# Patient Record
Sex: FEMALE | Race: WHITE | NOT HISPANIC OR LATINO | URBAN - METROPOLITAN AREA
[De-identification: names, ages, dates, MRNs, and addresses within clinical notes are randomized per-mention and may not be internally consistent; named-entity substitution may affect disease eponyms.]

---

## 2023-05-02 ENCOUNTER — TELEPHONE (OUTPATIENT)
Dept: PRIMARY CARE | Facility: CLINIC | Age: 68
End: 2023-05-02
Payer: MEDICARE

## 2023-05-05 LAB
ALANINE AMINOTRANSFERASE (SGPT) (U/L) IN SER/PLAS: 10 U/L (ref 7–45)
ALBUMIN (G/DL) IN SER/PLAS: 3.9 G/DL (ref 3.4–5)
ALKALINE PHOSPHATASE (U/L) IN SER/PLAS: 76 U/L (ref 33–136)
ANION GAP IN SER/PLAS: 12 MMOL/L (ref 10–20)
ASPARTATE AMINOTRANSFERASE (SGOT) (U/L) IN SER/PLAS: 14 U/L (ref 9–39)
BASOPHILS (10*3/UL) IN BLOOD BY AUTOMATED COUNT: 0.05 X10E9/L (ref 0–0.1)
BASOPHILS/100 LEUKOCYTES IN BLOOD BY AUTOMATED COUNT: 0.7 % (ref 0–2)
BILIRUBIN TOTAL (MG/DL) IN SER/PLAS: 0.6 MG/DL (ref 0–1.2)
CALCIUM (MG/DL) IN SER/PLAS: 9.6 MG/DL (ref 8.6–10.3)
CARBON DIOXIDE, TOTAL (MMOL/L) IN SER/PLAS: 28 MMOL/L (ref 21–32)
CHLORIDE (MMOL/L) IN SER/PLAS: 103 MMOL/L (ref 98–107)
CREATININE (MG/DL) IN SER/PLAS: 0.8 MG/DL (ref 0.5–1.05)
EOSINOPHILS (10*3/UL) IN BLOOD BY AUTOMATED COUNT: 0.16 X10E9/L (ref 0–0.7)
EOSINOPHILS/100 LEUKOCYTES IN BLOOD BY AUTOMATED COUNT: 2.2 % (ref 0–6)
ERYTHROCYTE DISTRIBUTION WIDTH (RATIO) BY AUTOMATED COUNT: 14.6 % (ref 11.5–14.5)
ERYTHROCYTE MEAN CORPUSCULAR HEMOGLOBIN CONCENTRATION (G/DL) BY AUTOMATED: 32 G/DL (ref 32–36)
ERYTHROCYTE MEAN CORPUSCULAR VOLUME (FL) BY AUTOMATED COUNT: 95 FL (ref 80–100)
ERYTHROCYTES (10*6/UL) IN BLOOD BY AUTOMATED COUNT: 4.49 X10E12/L (ref 4–5.2)
GFR FEMALE: 80 ML/MIN/1.73M2
GLUCOSE (MG/DL) IN SER/PLAS: 90 MG/DL (ref 74–99)
HEMATOCRIT (%) IN BLOOD BY AUTOMATED COUNT: 42.8 % (ref 36–46)
HEMOGLOBIN (G/DL) IN BLOOD: 13.7 G/DL (ref 12–16)
IMMATURE GRANULOCYTES/100 LEUKOCYTES IN BLOOD BY AUTOMATED COUNT: 0.4 % (ref 0–0.9)
LEUKOCYTES (10*3/UL) IN BLOOD BY AUTOMATED COUNT: 7.3 X10E9/L (ref 4.4–11.3)
LYMPHOCYTES (10*3/UL) IN BLOOD BY AUTOMATED COUNT: 2.17 X10E9/L (ref 1.2–4.8)
LYMPHOCYTES/100 LEUKOCYTES IN BLOOD BY AUTOMATED COUNT: 29.7 % (ref 13–44)
MONOCYTES (10*3/UL) IN BLOOD BY AUTOMATED COUNT: 0.7 X10E9/L (ref 0.1–1)
MONOCYTES/100 LEUKOCYTES IN BLOOD BY AUTOMATED COUNT: 9.6 % (ref 2–10)
NEUTROPHILS (10*3/UL) IN BLOOD BY AUTOMATED COUNT: 4.2 X10E9/L (ref 1.2–7.7)
NEUTROPHILS/100 LEUKOCYTES IN BLOOD BY AUTOMATED COUNT: 57.4 % (ref 40–80)
PLATELETS (10*3/UL) IN BLOOD AUTOMATED COUNT: 307 X10E9/L (ref 150–450)
POTASSIUM (MMOL/L) IN SER/PLAS: 4.3 MMOL/L (ref 3.5–5.3)
PROTEIN TOTAL: 6.4 G/DL (ref 6.4–8.2)
SODIUM (MMOL/L) IN SER/PLAS: 139 MMOL/L (ref 136–145)
THYROTROPIN (MIU/L) IN SER/PLAS BY DETECTION LIMIT <= 0.05 MIU/L: 1.43 MIU/L (ref 0.44–3.98)
UREA NITROGEN (MG/DL) IN SER/PLAS: 16 MG/DL (ref 6–23)

## 2023-05-09 ENCOUNTER — OFFICE VISIT (OUTPATIENT)
Dept: PRIMARY CARE | Facility: CLINIC | Age: 68
End: 2023-05-09
Payer: MEDICARE

## 2023-05-09 VITALS
SYSTOLIC BLOOD PRESSURE: 120 MMHG | DIASTOLIC BLOOD PRESSURE: 78 MMHG | TEMPERATURE: 97.9 F | WEIGHT: 175 LBS | BODY MASS INDEX: 28.12 KG/M2 | HEIGHT: 66 IN

## 2023-05-09 DIAGNOSIS — R05.9 COUGH, UNSPECIFIED TYPE: ICD-10-CM

## 2023-05-09 DIAGNOSIS — E78.2 MIXED HYPERLIPIDEMIA: ICD-10-CM

## 2023-05-09 DIAGNOSIS — I10 PRIMARY HYPERTENSION: ICD-10-CM

## 2023-05-09 DIAGNOSIS — E03.9 HYPOTHYROIDISM, UNSPECIFIED TYPE: Primary | ICD-10-CM

## 2023-05-09 DIAGNOSIS — J98.01 BRONCHOSPASM: ICD-10-CM

## 2023-05-09 DIAGNOSIS — Z12.31 ENCOUNTER FOR SCREENING MAMMOGRAM FOR MALIGNANT NEOPLASM OF BREAST: ICD-10-CM

## 2023-05-09 DIAGNOSIS — R73.9 ELEVATED BLOOD SUGAR: ICD-10-CM

## 2023-05-09 PROBLEM — M54.16 LUMBAR RADICULOPATHY: Status: ACTIVE | Noted: 2023-05-09

## 2023-05-09 PROBLEM — E55.9 VITAMIN D DEFICIENCY: Status: ACTIVE | Noted: 2023-05-09

## 2023-05-09 PROBLEM — I67.83 PRES (POSTERIOR REVERSIBLE ENCEPHALOPATHY SYNDROME): Status: ACTIVE | Noted: 2023-05-09

## 2023-05-09 PROBLEM — K64.8 PROLAPSED INTERNAL HEMORRHOIDS: Status: ACTIVE | Noted: 2023-05-09

## 2023-05-09 PROBLEM — F41.8 ANXIETY ASSOCIATED WITH DEPRESSION: Status: ACTIVE | Noted: 2023-05-09

## 2023-05-09 PROBLEM — G47.00 INSOMNIA: Status: ACTIVE | Noted: 2023-05-09

## 2023-05-09 PROBLEM — K64.9 HEMORRHOID: Status: ACTIVE | Noted: 2023-05-09

## 2023-05-09 PROBLEM — Z98.890 H/O MITRAL VALVE REPAIR: Status: ACTIVE | Noted: 2023-05-09

## 2023-05-09 PROBLEM — H35.363 PERIPHERAL DRUSEN OF BOTH EYES: Status: ACTIVE | Noted: 2023-05-09

## 2023-05-09 PROBLEM — H52.13 BILATERAL MYOPIA: Status: ACTIVE | Noted: 2023-05-09

## 2023-05-09 PROBLEM — R01.1 CARDIAC MURMUR, UNSPECIFIED: Status: ACTIVE | Noted: 2023-05-09

## 2023-05-09 PROBLEM — R26.81 UNSTEADY GAIT: Status: ACTIVE | Noted: 2023-05-09

## 2023-05-09 PROBLEM — M54.9 BACK PAIN: Status: ACTIVE | Noted: 2023-05-09

## 2023-05-09 PROBLEM — R10.2 PELVIC PRESSURE IN FEMALE: Status: ACTIVE | Noted: 2023-05-09

## 2023-05-09 PROBLEM — I34.0 MITRAL REGURGITATION: Status: ACTIVE | Noted: 2023-05-09

## 2023-05-09 PROBLEM — H25.13 NUCLEAR SCLEROSIS OF BOTH EYES: Status: ACTIVE | Noted: 2023-05-09

## 2023-05-09 PROBLEM — E04.1 THYROID NODULE: Status: ACTIVE | Noted: 2023-05-09

## 2023-05-09 PROBLEM — K59.00 CONSTIPATION: Status: ACTIVE | Noted: 2023-05-09

## 2023-05-09 PROBLEM — H52.4 BILATERAL PRESBYOPIA: Status: ACTIVE | Noted: 2023-05-09

## 2023-05-09 PROBLEM — R79.89 ABNORMAL THYROID BLOOD TEST: Status: ACTIVE | Noted: 2023-05-09

## 2023-05-09 PROCEDURE — 1159F MED LIST DOCD IN RCRD: CPT | Performed by: NURSE PRACTITIONER

## 2023-05-09 PROCEDURE — 3078F DIAST BP <80 MM HG: CPT | Performed by: NURSE PRACTITIONER

## 2023-05-09 PROCEDURE — 1036F TOBACCO NON-USER: CPT | Performed by: NURSE PRACTITIONER

## 2023-05-09 PROCEDURE — 99214 OFFICE O/P EST MOD 30 MIN: CPT | Performed by: NURSE PRACTITIONER

## 2023-05-09 PROCEDURE — 3074F SYST BP LT 130 MM HG: CPT | Performed by: NURSE PRACTITIONER

## 2023-05-09 PROCEDURE — 1160F RVW MEDS BY RX/DR IN RCRD: CPT | Performed by: NURSE PRACTITIONER

## 2023-05-09 RX ORDER — BENZONATATE 200 MG/1
200 CAPSULE ORAL 3 TIMES DAILY PRN
Qty: 42 CAPSULE | Refills: 0 | Status: SHIPPED | OUTPATIENT
Start: 2023-05-09 | End: 2023-06-08

## 2023-05-09 RX ORDER — ACETAMINOPHEN 500 MG
50 TABLET ORAL DAILY
COMMUNITY
Start: 2014-03-11 | End: 2023-11-21 | Stop reason: WASHOUT

## 2023-05-09 RX ORDER — AMLODIPINE BESYLATE 10 MG/1
10 TABLET ORAL DAILY
COMMUNITY
End: 2023-05-09 | Stop reason: SDUPTHER

## 2023-05-09 RX ORDER — ALBUTEROL SULFATE 90 UG/1
AEROSOL, METERED RESPIRATORY (INHALATION)
COMMUNITY
Start: 2018-04-13 | End: 2023-11-21 | Stop reason: WASHOUT

## 2023-05-09 RX ORDER — NEBIVOLOL 10 MG/1
10 TABLET ORAL DAILY
COMMUNITY
End: 2023-05-09 | Stop reason: SDUPTHER

## 2023-05-09 RX ORDER — MAGNESIUM 200 MG
1 TABLET ORAL DAILY
COMMUNITY
Start: 2022-07-07

## 2023-05-09 RX ORDER — LOSARTAN POTASSIUM 100 MG/1
100 TABLET ORAL DAILY
COMMUNITY
End: 2023-05-09 | Stop reason: SDUPTHER

## 2023-05-09 RX ORDER — ASPIRIN 81 MG/1
1 TABLET ORAL DAILY
COMMUNITY
Start: 2021-10-22

## 2023-05-09 RX ORDER — LOSARTAN POTASSIUM 100 MG/1
100 TABLET ORAL DAILY
Qty: 90 TABLET | Refills: 3 | Status: SHIPPED | OUTPATIENT
Start: 2023-05-09 | End: 2024-06-03 | Stop reason: SDUPTHER

## 2023-05-09 RX ORDER — LEVOTHYROXINE SODIUM 100 UG/1
100 TABLET ORAL
COMMUNITY
End: 2023-05-09 | Stop reason: SDUPTHER

## 2023-05-09 RX ORDER — CETIRIZINE HYDROCHLORIDE 10 MG/1
1 TABLET ORAL DAILY PRN
COMMUNITY
Start: 2014-03-11 | End: 2023-11-21 | Stop reason: WASHOUT

## 2023-05-09 RX ORDER — LEVOTHYROXINE SODIUM 100 UG/1
100 TABLET ORAL
Qty: 90 TABLET | Refills: 3 | Status: SHIPPED | OUTPATIENT
Start: 2023-05-09 | End: 2023-11-21 | Stop reason: WASHOUT

## 2023-05-09 RX ORDER — NEBIVOLOL 10 MG/1
10 TABLET ORAL DAILY
Qty: 90 TABLET | Refills: 3 | Status: SHIPPED | OUTPATIENT
Start: 2023-05-09 | End: 2024-06-03 | Stop reason: SDUPTHER

## 2023-05-09 RX ORDER — AMLODIPINE BESYLATE 10 MG/1
10 TABLET ORAL DAILY
Qty: 90 TABLET | Refills: 3 | Status: SHIPPED | OUTPATIENT
Start: 2023-05-09 | End: 2024-06-03

## 2023-05-09 ASSESSMENT — PATIENT HEALTH QUESTIONNAIRE - PHQ9
2. FEELING DOWN, DEPRESSED OR HOPELESS: NOT AT ALL
1. LITTLE INTEREST OR PLEASURE IN DOING THINGS: NOT AT ALL
SUM OF ALL RESPONSES TO PHQ9 QUESTIONS 1 AND 2: 0

## 2023-05-09 NOTE — PROGRESS NOTES
Subjective   Patient ID: Jeanna Haynes is a 67 y.o. female who presents for Follow-up (Review labs, med refill).       Recent Results (from the past 1008 hour(s))   TSH with reflex to Free T4 if abnormal    Collection Time: 05/05/23 10:09 AM   Result Value Ref Range    TSH 1.43 0.44 - 3.98 mIU/L   CBC and Auto Differential    Collection Time: 05/05/23 10:09 AM   Result Value Ref Range    WBC 7.3 4.4 - 11.3 x10E9/L    RBC 4.49 4.00 - 5.20 x10E12/L    Hemoglobin 13.7 12.0 - 16.0 g/dL    Hematocrit 42.8 36.0 - 46.0 %    MCV 95 80 - 100 fL    MCHC 32.0 32.0 - 36.0 g/dL    Platelets 307 150 - 450 x10E9/L    RDW 14.6 (H) 11.5 - 14.5 %    Neutrophils % 57.4 40.0 - 80.0 %    Immature Granulocytes %, Automated 0.4 0.0 - 0.9 %    Lymphocytes % 29.7 13.0 - 44.0 %    Monocytes % 9.6 2.0 - 10.0 %    Eosinophils % 2.2 0.0 - 6.0 %    Basophils % 0.7 0.0 - 2.0 %    Neutrophils Absolute 4.20 1.20 - 7.70 x10E9/L    Lymphocytes Absolute 2.17 1.20 - 4.80 x10E9/L    Monocytes Absolute 0.70 0.10 - 1.00 x10E9/L    Eosinophils Absolute 0.16 0.00 - 0.70 x10E9/L    Basophils Absolute 0.05 0.00 - 0.10 x10E9/L   Comprehensive Metabolic Panel    Collection Time: 05/05/23 10:09 AM   Result Value Ref Range    Glucose 90 74 - 99 mg/dL    Sodium 139 136 - 145 mmol/L    Potassium 4.3 3.5 - 5.3 mmol/L    Chloride 103 98 - 107 mmol/L    Bicarbonate 28 21 - 32 mmol/L    Anion Gap 12 10 - 20 mmol/L    Urea Nitrogen 16 6 - 23 mg/dL    Creatinine 0.80 0.50 - 1.05 mg/dL    GFR Female 80 >90 mL/min/1.73m2    Calcium 9.6 8.6 - 10.3 mg/dL    Albumin 3.9 3.4 - 5.0 g/dL    Alkaline Phosphatase 76 33 - 136 U/L    Total Protein 6.4 6.4 - 8.2 g/dL    AST 14 9 - 39 U/L    Total Bilirubin 0.6 0.0 - 1.2 mg/dL    ALT (SGPT) 10 7 - 45 U/L           HPI     Just had surgery on left foot.  Fused toe and bunion was shaved off.    Has been 3 months tomorrow.    PRES - symptoms have all resolved.  Did not get COVID   Trying to get more active again.    Not a great sleeper.   "Chronic    Review of Systems   Constitutional:  Positive for activity change.   Musculoskeletal:  Positive for gait problem.   Psychiatric/Behavioral:  Positive for sleep disturbance.          Objective   /78   Temp 36.6 °C (97.9 °F)   Ht 1.664 m (5' 5.5\")   Wt 79.4 kg (175 lb)   BMI 28.68 kg/m²     Physical Exam  Vitals and nursing note reviewed.   Constitutional:       Appearance: Normal appearance.   HENT:      Head: Normocephalic and atraumatic.      Right Ear: Tympanic membrane, ear canal and external ear normal.      Left Ear: Tympanic membrane, ear canal and external ear normal.      Nose: Nose normal.      Mouth/Throat:      Pharynx: Oropharynx is clear.   Neck:      Thyroid: No thyroid mass, thyromegaly or thyroid tenderness.   Cardiovascular:      Rate and Rhythm: Normal rate and regular rhythm.      Heart sounds: Normal heart sounds.   Pulmonary:      Effort: Pulmonary effort is normal.      Breath sounds: Normal breath sounds.   Abdominal:      General: Bowel sounds are normal.      Palpations: Abdomen is soft.   Genitourinary:     Comments: Defer  Musculoskeletal:      Cervical back: Normal range of motion and neck supple.      Comments: Mildly antalgic gait noted   Lymphadenopathy:      Cervical: No cervical adenopathy.   Skin:     General: Skin is warm.   Neurological:      Mental Status: She is alert and oriented to person, place, and time. Mental status is at baseline.   Psychiatric:         Mood and Affect: Mood normal.         Behavior: Behavior normal.         Thought Content: Thought content normal.         Assessment/Plan   Problem List Items Addressed This Visit          Respiratory    Bronchospasm     Refill Rx for PRN use         Relevant Medications    albuterol 90 mcg/actuation aerosol powdr breath activated inhaler    Cough    Relevant Medications    benzonatate (Tessalon) 200 mg capsule       Circulatory    Hypertension     Stable on rx         Relevant Medications    " amLODIPine (Norvasc) 10 mg tablet    losartan (Cozaar) 100 mg tablet    nebivolol (Bystolic) 10 mg tablet       Endocrine/Metabolic    Hypothyroidism - Primary     Dosage on target  - refill         Relevant Medications    levothyroxine (Synthroid, Levoxyl) 100 mcg tablet    Other Relevant Orders    TSH with reflex to Free T4 if abnormal       Other    Mixed hyperlipidemia    Relevant Orders    Lipid Panel    Encounter for screening mammogram for malignant neoplasm of breast     Order done         Relevant Orders    BI mammo bilateral screening tomosynthesis     Other Visit Diagnoses       Elevated blood sugar        Relevant Orders    Hemoglobin A1C                 Katharine Magdaleno, APRN-CNP

## 2023-05-09 NOTE — PATIENT INSTRUCTIONS
Good to see you today.  I am glad you foot is healing.    Medications refilled.    Mammogram due in September.  [facility]ic[facility] after 11/16   Please do fasting labs before that visit.  Let me know if you need anything.

## 2023-05-26 PROBLEM — Z12.31 ENCOUNTER FOR SCREENING MAMMOGRAM FOR MALIGNANT NEOPLASM OF BREAST: Status: ACTIVE | Noted: 2023-05-26

## 2023-05-26 PROBLEM — R05.9 COUGH: Status: ACTIVE | Noted: 2023-05-26

## 2023-05-26 PROBLEM — J98.01 BRONCHOSPASM: Status: ACTIVE | Noted: 2023-05-26

## 2023-05-26 ASSESSMENT — ENCOUNTER SYMPTOMS
ACTIVITY CHANGE: 1
SLEEP DISTURBANCE: 1

## 2023-05-26 NOTE — ASSESSMENT & PLAN NOTE
>>ASSESSMENT AND PLAN FOR HYPERTENSION WRITTEN ON 5/26/2023  5:48 AM BY ITALO JAVIER    Stable on rx

## 2023-11-16 ENCOUNTER — LAB (OUTPATIENT)
Dept: LAB | Facility: LAB | Age: 68
End: 2023-11-16
Payer: MEDICARE

## 2023-11-16 DIAGNOSIS — E03.9 HYPOTHYROIDISM, UNSPECIFIED TYPE: ICD-10-CM

## 2023-11-16 DIAGNOSIS — R73.9 ELEVATED BLOOD SUGAR: ICD-10-CM

## 2023-11-16 DIAGNOSIS — E78.2 MIXED HYPERLIPIDEMIA: ICD-10-CM

## 2023-11-16 LAB
CHOLEST SERPL-MCNC: 208 MG/DL (ref 0–199)
CHOLESTEROL/HDL RATIO: 2.6
HDLC SERPL-MCNC: 80.7 MG/DL
LDLC SERPL CALC-MCNC: 102 MG/DL
NON HDL CHOLESTEROL: 127 MG/DL (ref 0–149)
T4 FREE SERPL-MCNC: 0.83 NG/DL (ref 0.61–1.12)
TRIGL SERPL-MCNC: 125 MG/DL (ref 0–149)
TSH SERPL-ACNC: 10.19 MIU/L (ref 0.44–3.98)
VLDL: 25 MG/DL (ref 0–40)

## 2023-11-16 PROCEDURE — 83036 HEMOGLOBIN GLYCOSYLATED A1C: CPT

## 2023-11-16 PROCEDURE — 36415 COLL VENOUS BLD VENIPUNCTURE: CPT

## 2023-11-16 PROCEDURE — 84443 ASSAY THYROID STIM HORMONE: CPT

## 2023-11-16 PROCEDURE — 80061 LIPID PANEL: CPT

## 2023-11-16 PROCEDURE — 84439 ASSAY OF FREE THYROXINE: CPT

## 2023-11-17 LAB
EST. AVERAGE GLUCOSE BLD GHB EST-MCNC: 137 MG/DL
HBA1C MFR BLD: 6.4 %

## 2023-11-21 ENCOUNTER — OFFICE VISIT (OUTPATIENT)
Dept: PRIMARY CARE | Facility: CLINIC | Age: 68
End: 2023-11-21
Payer: MEDICARE

## 2023-11-21 VITALS
WEIGHT: 189 LBS | TEMPERATURE: 97.2 F | SYSTOLIC BLOOD PRESSURE: 124 MMHG | BODY MASS INDEX: 30.37 KG/M2 | HEIGHT: 66 IN | DIASTOLIC BLOOD PRESSURE: 80 MMHG

## 2023-11-21 DIAGNOSIS — E03.9 HYPOTHYROIDISM, UNSPECIFIED TYPE: ICD-10-CM

## 2023-11-21 DIAGNOSIS — Z96.60 HISTORY OF JOINT REPLACEMENT, UNSPECIFIED JOINT: ICD-10-CM

## 2023-11-21 DIAGNOSIS — Z00.00 ROUTINE GENERAL MEDICAL EXAMINATION AT A HEALTH CARE FACILITY: Primary | ICD-10-CM

## 2023-11-21 DIAGNOSIS — Z78.0 POSTMENOPAUSAL: ICD-10-CM

## 2023-11-21 PROBLEM — I34.0 NONRHEUMATIC MITRAL (VALVE) INSUFFICIENCY: Status: ACTIVE | Noted: 2023-11-21

## 2023-11-21 PROBLEM — I10 ESSENTIAL (PRIMARY) HYPERTENSION: Status: ACTIVE | Noted: 2023-11-21

## 2023-11-21 PROCEDURE — 1159F MED LIST DOCD IN RCRD: CPT | Performed by: NURSE PRACTITIONER

## 2023-11-21 PROCEDURE — 1160F RVW MEDS BY RX/DR IN RCRD: CPT | Performed by: NURSE PRACTITIONER

## 2023-11-21 PROCEDURE — 3079F DIAST BP 80-89 MM HG: CPT | Performed by: NURSE PRACTITIONER

## 2023-11-21 PROCEDURE — 1170F FXNL STATUS ASSESSED: CPT | Performed by: NURSE PRACTITIONER

## 2023-11-21 PROCEDURE — 99213 OFFICE O/P EST LOW 20 MIN: CPT | Performed by: NURSE PRACTITIONER

## 2023-11-21 PROCEDURE — 1125F AMNT PAIN NOTED PAIN PRSNT: CPT | Performed by: NURSE PRACTITIONER

## 2023-11-21 PROCEDURE — 3074F SYST BP LT 130 MM HG: CPT | Performed by: NURSE PRACTITIONER

## 2023-11-21 PROCEDURE — G0439 PPPS, SUBSEQ VISIT: HCPCS | Performed by: NURSE PRACTITIONER

## 2023-11-21 PROCEDURE — 1036F TOBACCO NON-USER: CPT | Performed by: NURSE PRACTITIONER

## 2023-11-21 RX ORDER — BETAMETHASONE DIPROPIONATE 0.5 MG/G
CREAM TOPICAL
COMMUNITY
Start: 2023-09-12 | End: 2023-12-11 | Stop reason: WASHOUT

## 2023-11-21 RX ORDER — AMOXICILLIN 500 MG/1
CAPSULE ORAL
Qty: 12 CAPSULE | Refills: 1 | Status: SHIPPED | OUTPATIENT
Start: 2023-11-21

## 2023-11-21 RX ORDER — AMOXICILLIN 500 MG/1
500 CAPSULE ORAL
COMMUNITY
Start: 2023-09-12 | End: 2023-11-21 | Stop reason: SDUPTHER

## 2023-11-21 RX ORDER — LEVOTHYROXINE SODIUM 125 UG/1
125 TABLET ORAL DAILY
Qty: 90 TABLET | Refills: 0 | Status: SHIPPED | OUTPATIENT
Start: 2023-11-21 | End: 2024-01-29 | Stop reason: SDUPTHER

## 2023-11-21 RX ORDER — CHOLECALCIFEROL (VITAMIN D3) 25 MCG
2000 TABLET ORAL
COMMUNITY

## 2023-11-21 RX ORDER — MAGNESIUM GLUCONATE 27 MG(500)
600 TABLET ORAL
COMMUNITY

## 2023-11-21 RX ORDER — CETIRIZINE HYDROCHLORIDE 10 MG/1
10 TABLET ORAL
COMMUNITY

## 2023-11-21 ASSESSMENT — PATIENT HEALTH QUESTIONNAIRE - PHQ9
2. FEELING DOWN, DEPRESSED OR HOPELESS: NOT AT ALL
SUM OF ALL RESPONSES TO PHQ9 QUESTIONS 1 AND 2: 0
1. LITTLE INTEREST OR PLEASURE IN DOING THINGS: NOT AT ALL

## 2023-11-21 ASSESSMENT — ACTIVITIES OF DAILY LIVING (ADL)
GROCERY_SHOPPING: INDEPENDENT
MANAGING_FINANCES: INDEPENDENT
TAKING_MEDICATION: INDEPENDENT
DRESSING: INDEPENDENT
DOING_HOUSEWORK: INDEPENDENT
BATHING: INDEPENDENT

## 2023-11-21 NOTE — PROGRESS NOTES
"Subjective   Patient ID: Jeanna Haynes is a 68 y.o. female who presents for Medicare Annual Wellness Visit Subsequent.    HPI   Not walking a lot.  Has to do stretches every day.  Colonoscopy done after hospital stay last year when she had such bad constipation.    Left foot almost healed.  Regular with eye and dental check ups/    Review of Systems   Constitutional:  Positive for fatigue.   Musculoskeletal:  Positive for gait problem.   All other systems reviewed and are negative.      Objective   /80   Temp 36.2 °C (97.2 °F)   Ht 1.664 m (5' 5.5\")   Wt 85.7 kg (189 lb)   BMI 30.97 kg/m²     Physical Exam  Vitals and nursing note reviewed.   Constitutional:       Appearance: Normal appearance.   HENT:      Head: Normocephalic and atraumatic.      Right Ear: Tympanic membrane, ear canal and external ear normal.      Left Ear: Tympanic membrane, ear canal and external ear normal.      Nose: Nose normal.      Mouth/Throat:      Mouth: Mucous membranes are moist.      Pharynx: Oropharynx is clear.   Eyes:      Extraocular Movements: Extraocular movements intact.      Conjunctiva/sclera: Conjunctivae normal.      Pupils: Pupils are equal, round, and reactive to light.   Neck:      Thyroid: No thyroid mass, thyromegaly or thyroid tenderness.   Cardiovascular:      Rate and Rhythm: Normal rate and regular rhythm.      Pulses: Normal pulses.      Heart sounds: Normal heart sounds.   Pulmonary:      Effort: Pulmonary effort is normal.      Breath sounds: Normal breath sounds.   Abdominal:      General: Bowel sounds are normal.      Palpations: Abdomen is soft.   Genitourinary:     Comments: Deferred  Musculoskeletal:      Cervical back: Normal range of motion and neck supple.      Comments: Left foot with slight weakness noted.     Skin:     General: Skin is warm.      Capillary Refill: Capillary refill takes 2 to 3 seconds.   Neurological:      Mental Status: She is alert and oriented to person, place, and time. " Mental status is at baseline.   Psychiatric:         Mood and Affect: Mood normal.         Behavior: Behavior normal.         Thought Content: Thought content normal.         Judgment: Judgment normal.         Assessment/Plan      Diagnoses and all orders for this visit:  Routine general medical examination at a health care facility  Postmenopausal  -     XR DEXA bone density; Future  Hypothyroidism, unspecified type  -     levothyroxine (Synthroid, Levoxyl) 125 mcg tablet; Take 1 tablet (125 mcg) by mouth once daily.  History of joint replacement, unspecified joint  -     amoxicillin (Amoxil) 500 mg capsule; Take 4 PO one hour before dental appointment

## 2023-12-11 ENCOUNTER — HOSPITAL ENCOUNTER (OUTPATIENT)
Dept: CARDIOLOGY | Facility: CLINIC | Age: 68
Discharge: HOME | End: 2023-12-11
Payer: MEDICARE

## 2023-12-11 ENCOUNTER — OFFICE VISIT (OUTPATIENT)
Dept: CARDIOLOGY | Facility: CLINIC | Age: 68
End: 2023-12-11
Payer: MEDICARE

## 2023-12-11 ENCOUNTER — ANCILLARY PROCEDURE (OUTPATIENT)
Dept: RADIOLOGY | Facility: CLINIC | Age: 68
End: 2023-12-11
Payer: MEDICARE

## 2023-12-11 VITALS
TEMPERATURE: 97.3 F | WEIGHT: 187.5 LBS | HEIGHT: 66 IN | BODY MASS INDEX: 30.13 KG/M2 | HEART RATE: 69 BPM | DIASTOLIC BLOOD PRESSURE: 76 MMHG | SYSTOLIC BLOOD PRESSURE: 129 MMHG

## 2023-12-11 DIAGNOSIS — I34.0 NONRHEUMATIC MITRAL (VALVE) INSUFFICIENCY: Primary | ICD-10-CM

## 2023-12-11 DIAGNOSIS — I10 ESSENTIAL (PRIMARY) HYPERTENSION: ICD-10-CM

## 2023-12-11 DIAGNOSIS — I34.89 OTHER NONRHEUMATIC MITRAL VALVE DISORDERS: ICD-10-CM

## 2023-12-11 DIAGNOSIS — R60.9 EDEMA, UNSPECIFIED TYPE: ICD-10-CM

## 2023-12-11 DIAGNOSIS — Z78.0 POSTMENOPAUSAL: ICD-10-CM

## 2023-12-11 DIAGNOSIS — Z98.890 OTHER SPECIFIED POSTPROCEDURAL STATES: ICD-10-CM

## 2023-12-11 DIAGNOSIS — I36.1 NONRHEUMATIC TRICUSPID VALVE REGURGITATION: ICD-10-CM

## 2023-12-11 PROBLEM — Z96.60 HISTORY OF JOINT REPLACEMENT: Status: ACTIVE | Noted: 2023-12-11

## 2023-12-11 PROBLEM — Z00.00 ROUTINE GENERAL MEDICAL EXAMINATION AT A HEALTH CARE FACILITY: Status: ACTIVE | Noted: 2023-05-26

## 2023-12-11 LAB
AORTIC VALVE PEAK VELOCITY: 1.55
AV PEAK GRADIENT: 9.6
AVA (PEAK VEL): 2.23
EJECTION FRACTION APICAL 4 CHAMBER: 76.5
EJECTION FRACTION: 71
LEFT ATRIUM VOLUME AREA LENGTH INDEX BSA: 42.4
LEFT VENTRICLE INTERNAL DIMENSION DIASTOLE: 4.38 (ref 3.5–6)
LEFT VENTRICULAR OUTFLOW TRACT DIAMETER: 2.03
RIGHT VENTRICLE FREE WALL PEAK S': 8
RIGHT VENTRICLE PEAK SYSTOLIC PRESSURE: 30

## 2023-12-11 PROCEDURE — 93306 TTE W/DOPPLER COMPLETE: CPT | Performed by: INTERNAL MEDICINE

## 2023-12-11 PROCEDURE — 1125F AMNT PAIN NOTED PAIN PRSNT: CPT | Performed by: NURSE PRACTITIONER

## 2023-12-11 PROCEDURE — 1159F MED LIST DOCD IN RCRD: CPT | Performed by: NURSE PRACTITIONER

## 2023-12-11 PROCEDURE — 1036F TOBACCO NON-USER: CPT | Performed by: NURSE PRACTITIONER

## 2023-12-11 PROCEDURE — 93306 TTE W/DOPPLER COMPLETE: CPT

## 2023-12-11 PROCEDURE — 1160F RVW MEDS BY RX/DR IN RCRD: CPT | Performed by: NURSE PRACTITIONER

## 2023-12-11 PROCEDURE — 99214 OFFICE O/P EST MOD 30 MIN: CPT | Performed by: NURSE PRACTITIONER

## 2023-12-11 PROCEDURE — 77080 DXA BONE DENSITY AXIAL: CPT

## 2023-12-11 PROCEDURE — 3078F DIAST BP <80 MM HG: CPT | Performed by: NURSE PRACTITIONER

## 2023-12-11 PROCEDURE — 3074F SYST BP LT 130 MM HG: CPT | Performed by: NURSE PRACTITIONER

## 2023-12-11 PROCEDURE — 77080 DXA BONE DENSITY AXIAL: CPT | Performed by: RADIOLOGY

## 2023-12-11 ASSESSMENT — ENCOUNTER SYMPTOMS: FATIGUE: 1

## 2023-12-11 NOTE — PROGRESS NOTES
"Chief Complaint:   History of Mitral Valve Repair     History Of Present Illness:    Jeanna Haynes is a 68 y.o. female here for follow up post Mitral valve repair in 2021.     A little SOB with walking stairs. Has unable to exercise to due to foot. No other cardiac complaints.     LHC in 2021 which was negative for significant CAD  (MV repair with 34 stimuplus annuloplasty ring) - 10/18/2021     Allergies:  Penicillins and Latex    Review of Systems  All pertinent systems have been reviewed and are negative except for what is stated in the history of present illness.    All other systems have been reviewed and are negative and noncontributory to this patient's current ailments.     Visit Vitals  /76 (BP Location: Right arm)   Pulse 69   Temp 36.3 °C (97.3 °F)   Ht 1.664 m (5' 5.5\")   Wt 85 kg (187 lb 8 oz)   BMI 30.73 kg/m²   OB Status Postmenopausal   Smoking Status Former   BSA 1.98 m²         Last Labs:  CBC -  Lab Results   Component Value Date    WBC 7.3 05/05/2023    HGB 13.7 05/05/2023    HCT 42.8 05/05/2023    MCV 95 05/05/2023     05/05/2023       CMP -  Lab Results   Component Value Date    CALCIUM 9.6 05/05/2023    PHOS 2.3 (L) 10/22/2021    PROT 6.4 05/05/2023    ALBUMIN 3.9 05/05/2023    AST 14 05/05/2023    ALT 10 05/05/2023    ALKPHOS 76 05/05/2023    BILITOT 0.6 05/05/2023       LIPID PANEL -   Lab Results   Component Value Date    CHOL 208 (H) 11/16/2023    TRIG 125 11/16/2023    HDL 80.7 11/16/2023    CHHDL 2.6 11/16/2023    LDLF 79 11/14/2022    VLDL 25 11/16/2023    NHDL 127 11/16/2023       RENAL FUNCTION PANEL -   Lab Results   Component Value Date    GLUCOSE 90 05/05/2023     05/05/2023    K 4.3 05/05/2023     05/05/2023    CO2 28 05/05/2023    ANIONGAP 12 05/05/2023    BUN 16 05/05/2023    CREATININE 0.80 05/05/2023    CALCIUM 9.6 05/05/2023    PHOS 2.3 (L) 10/22/2021    ALBUMIN 3.9 05/05/2023        Lab Results   Component Value Date    HGBA1C 6.4 (H) 11/16/2023 "       Objective   Vitals reviewed.   Constitutional:       Appearance: Healthy appearance. Not in distress.   Neck:      Vascular: No JVR. JVD normal.   Pulmonary:      Effort: Pulmonary effort is normal.      Breath sounds: Normal breath sounds. No wheezing. No rhonchi. No rales.   Chest:      Chest wall: Not tender to palpatation.   Cardiovascular:      PMI at left midclavicular line. Normal rate. Regular rhythm. Normal S1. Normal S2.       Murmurs: There is a grade 1/6 systolic murmur.      No gallop.  No click. No rub.   Pulses:     Intact distal pulses.   Edema:     Peripheral edema absent.   Abdominal:      General: Bowel sounds are normal.      Palpations: Abdomen is soft.      Tenderness: There is no abdominal tenderness.   Musculoskeletal: Normal range of motion.         General: No tenderness. Skin:     General: Skin is warm and dry.   Neurological:      General: No focal deficit present.      Mental Status: Alert and oriented to person, place and time.       Assessment/Plan   Diagnoses and all orders for this visit:  Nonrheumatic mitral (valve) insufficiency  - sp repair  - annual echo  - echo performed today  Essential (primary) hypertension  - well controlled  Edema  - as long as monitor sodium intake LE do not swell   Tricuspid regurgitation  - mild  - annual echo    Current Outpatient Medications on File Prior to Visit   Medication Sig Dispense Refill    amLODIPine (Norvasc) 10 mg tablet Take 1 tablet (10 mg) by mouth once daily. 90 tablet 3    amoxicillin (Amoxil) 500 mg capsule Take 4 PO one hour before dental appointment 12 capsule 1    aspirin 81 mg EC tablet Take 1 tablet (81 mg) by mouth once daily.      cetirizine (ZyrTEC) 10 mg tablet Take 1 tablet (10 mg) by mouth once daily.      cholecalciferol (Vitamin D-3) 25 MCG (1000 UT) tablet Take 2 tablets (2,000 Units) by mouth once daily.      levothyroxine (Synthroid, Levoxyl) 125 mcg tablet Take 1 tablet (125 mcg) by mouth once daily. 90 tablet 0     losartan (Cozaar) 100 mg tablet Take 1 tablet (100 mg) by mouth once daily. 90 tablet 3    magnesium 200 mg tablet Take 1 tablet (200 mg) by mouth once daily.      nebivolol (Bystolic) 10 mg tablet Take 1 tablet (10 mg) by mouth once daily. 90 tablet 3    potassium gluconate 600 mg (99 mg) tablet Take 600 mg by mouth.      [DISCONTINUED] betamethasone dipropionate 0.05 % cream Apply topically.       No current facility-administered medications on file prior to visit.

## 2023-12-11 NOTE — PATIENT INSTRUCTIONS
Recheck thyroid (NON-FASTING) in ~8 weeks.    NO SUPPLEMENTS the day of the test.  Let me know if you need anything.

## 2023-12-12 ENCOUNTER — APPOINTMENT (OUTPATIENT)
Dept: RADIOLOGY | Facility: CLINIC | Age: 68
End: 2023-12-12
Payer: MEDICARE

## 2024-01-17 ENCOUNTER — LAB (OUTPATIENT)
Dept: LAB | Facility: LAB | Age: 69
End: 2024-01-17
Payer: MEDICARE

## 2024-01-17 DIAGNOSIS — E03.9 HYPOTHYROIDISM, UNSPECIFIED TYPE: ICD-10-CM

## 2024-01-17 LAB — TSH SERPL-ACNC: 0.86 MIU/L (ref 0.44–3.98)

## 2024-01-17 PROCEDURE — 84443 ASSAY THYROID STIM HORMONE: CPT

## 2024-01-17 PROCEDURE — 36415 COLL VENOUS BLD VENIPUNCTURE: CPT

## 2024-01-26 ENCOUNTER — TELEPHONE (OUTPATIENT)
Dept: PRIMARY CARE | Facility: CLINIC | Age: 69
End: 2024-01-26
Payer: MEDICARE

## 2024-01-26 DIAGNOSIS — E03.9 HYPOTHYROIDISM, UNSPECIFIED TYPE: Primary | ICD-10-CM

## 2024-01-26 NOTE — TELEPHONE ENCOUNTER
----- Message from ITALO Johansen sent at 1/26/2024  6:04 AM EST -----  Let know thyroid looks good.  Does she need refill?  Please verify pharmacy.    I want her to check in 6 months.

## 2024-01-26 NOTE — RESULT ENCOUNTER NOTE
Let know thyroid looks good.  Does she need refill?  Please verify pharmacy.    I want her to check in 6 months.

## 2024-01-29 DIAGNOSIS — E03.9 HYPOTHYROIDISM, UNSPECIFIED TYPE: ICD-10-CM

## 2024-01-29 RX ORDER — LEVOTHYROXINE SODIUM 125 UG/1
125 TABLET ORAL DAILY
Qty: 90 TABLET | Refills: 1 | Status: SHIPPED | OUTPATIENT
Start: 2024-01-29 | End: 2024-06-03 | Stop reason: SDUPTHER

## 2024-02-12 ENCOUNTER — LAB REQUISITION (OUTPATIENT)
Dept: LAB | Facility: HOSPITAL | Age: 69
End: 2024-02-12
Payer: MEDICARE

## 2024-02-12 DIAGNOSIS — N39.0 URINARY TRACT INFECTION, SITE NOT SPECIFIED: ICD-10-CM

## 2024-02-12 PROCEDURE — 87086 URINE CULTURE/COLONY COUNT: CPT

## 2024-02-14 LAB — BACTERIA UR CULT: NORMAL

## 2024-06-02 DIAGNOSIS — I10 PRIMARY HYPERTENSION: ICD-10-CM

## 2024-06-03 ENCOUNTER — LAB (OUTPATIENT)
Dept: LAB | Facility: LAB | Age: 69
End: 2024-06-03
Payer: MEDICARE

## 2024-06-03 DIAGNOSIS — I10 PRIMARY HYPERTENSION: ICD-10-CM

## 2024-06-03 DIAGNOSIS — E03.9 HYPOTHYROIDISM, UNSPECIFIED TYPE: ICD-10-CM

## 2024-06-03 RX ORDER — AMLODIPINE BESYLATE 10 MG/1
10 TABLET ORAL DAILY
Qty: 90 TABLET | Refills: 0 | Status: SHIPPED | OUTPATIENT
Start: 2024-06-03 | End: 2024-06-03 | Stop reason: SDUPTHER

## 2024-06-04 RX ORDER — AMLODIPINE BESYLATE 10 MG/1
10 TABLET ORAL DAILY
Qty: 90 TABLET | Refills: 1 | Status: SHIPPED | OUTPATIENT
Start: 2024-06-03

## 2024-06-04 RX ORDER — LEVOTHYROXINE SODIUM 125 UG/1
125 TABLET ORAL DAILY
Qty: 90 TABLET | Refills: 1 | Status: SHIPPED | OUTPATIENT
Start: 2024-06-03 | End: 2025-06-03

## 2024-06-04 RX ORDER — NEBIVOLOL 10 MG/1
10 TABLET ORAL DAILY
Qty: 90 TABLET | Refills: 1 | Status: SHIPPED | OUTPATIENT
Start: 2024-06-03

## 2024-06-04 RX ORDER — LOSARTAN POTASSIUM 100 MG/1
100 TABLET ORAL DAILY
Qty: 90 TABLET | Refills: 1 | Status: SHIPPED | OUTPATIENT
Start: 2024-06-03

## 2024-07-07 ENCOUNTER — HOSPITAL ENCOUNTER (INPATIENT)
Facility: HOSPITAL | Age: 69
End: 2024-07-07
Attending: STUDENT IN AN ORGANIZED HEALTH CARE EDUCATION/TRAINING PROGRAM | Admitting: INTERNAL MEDICINE
Payer: MEDICARE

## 2024-07-07 ENCOUNTER — HOSPITAL ENCOUNTER (OUTPATIENT)
Dept: RADIOLOGY | Facility: EXTERNAL LOCATION | Age: 69
Discharge: HOME | End: 2024-07-07
Payer: MEDICARE

## 2024-07-07 ENCOUNTER — APPOINTMENT (OUTPATIENT)
Dept: RADIOLOGY | Facility: HOSPITAL | Age: 69
End: 2024-07-07
Payer: MEDICARE

## 2024-07-07 VITALS
HEIGHT: 65 IN | HEART RATE: 64 BPM | DIASTOLIC BLOOD PRESSURE: 74 MMHG | BODY MASS INDEX: 29.16 KG/M2 | TEMPERATURE: 97 F | RESPIRATION RATE: 18 BRPM | OXYGEN SATURATION: 94 % | SYSTOLIC BLOOD PRESSURE: 129 MMHG | WEIGHT: 175 LBS

## 2024-07-07 DIAGNOSIS — S72.011A CLOSED SUBCAPITAL FRACTURE OF RIGHT FEMUR, INITIAL ENCOUNTER (MULTI): Primary | ICD-10-CM

## 2024-07-07 DIAGNOSIS — M25.551 RIGHT HIP PAIN: ICD-10-CM

## 2024-07-07 DIAGNOSIS — I34.0 MITRAL VALVE INSUFFICIENCY, UNSPECIFIED ETIOLOGY: Chronic | ICD-10-CM

## 2024-07-07 PROBLEM — I10 HYPERTENSION: Chronic | Status: ACTIVE | Noted: 2023-05-09

## 2024-07-07 PROBLEM — E66.3 OVERWEIGHT WITH BODY MASS INDEX (BMI) 25.0-29.9: Chronic | Status: ACTIVE | Noted: 2024-07-07

## 2024-07-07 PROBLEM — M75.20 BICEPS TENDINITIS: Status: ACTIVE | Noted: 2018-08-14

## 2024-07-07 PROBLEM — E78.2 MIXED HYPERLIPIDEMIA: Chronic | Status: ACTIVE | Noted: 2023-05-09

## 2024-07-07 PROBLEM — E66.3 OVERWEIGHT WITH BODY MASS INDEX (BMI) 25.0-29.9: Status: ACTIVE | Noted: 2024-07-07

## 2024-07-07 PROBLEM — I21.9 MYOCARDIAL INFARCTION (MULTI): Status: ACTIVE | Noted: 2022-06-05

## 2024-07-07 PROBLEM — I21.9 MYOCARDIAL INFARCTION (MULTI): Status: RESOLVED | Noted: 2022-06-05 | Resolved: 2024-07-07

## 2024-07-07 PROBLEM — S83.249A ACUTE MEDIAL MENISCAL TEAR: Status: ACTIVE | Noted: 2017-08-24

## 2024-07-07 PROBLEM — E03.9 HYPOTHYROIDISM: Chronic | Status: ACTIVE | Noted: 2023-05-09

## 2024-07-07 PROBLEM — M75.40 IMPINGEMENT SYNDROME OF SHOULDER REGION: Status: ACTIVE | Noted: 2018-08-14

## 2024-07-07 LAB
ABO GROUP (TYPE) IN BLOOD: NORMAL
ALBUMIN SERPL BCP-MCNC: 4.3 G/DL (ref 3.4–5)
ALP SERPL-CCNC: 73 U/L (ref 33–136)
ALT SERPL W P-5'-P-CCNC: 18 U/L (ref 7–45)
ANION GAP SERPL CALC-SCNC: 15 MMOL/L (ref 10–20)
ANTIBODY SCREEN: NORMAL
AST SERPL W P-5'-P-CCNC: 20 U/L (ref 9–39)
BASOPHILS # BLD AUTO: 0.06 X10*3/UL (ref 0–0.1)
BASOPHILS NFR BLD AUTO: 0.4 %
BILIRUB SERPL-MCNC: 0.6 MG/DL (ref 0–1.2)
BUN SERPL-MCNC: 17 MG/DL (ref 6–23)
CALCIUM SERPL-MCNC: 9.7 MG/DL (ref 8.6–10.3)
CHLORIDE SERPL-SCNC: 103 MMOL/L (ref 98–107)
CO2 SERPL-SCNC: 23 MMOL/L (ref 21–32)
CREAT SERPL-MCNC: 0.81 MG/DL (ref 0.5–1.05)
EGFRCR SERPLBLD CKD-EPI 2021: 79 ML/MIN/1.73M*2
EOSINOPHIL # BLD AUTO: 0.03 X10*3/UL (ref 0–0.7)
EOSINOPHIL NFR BLD AUTO: 0.2 %
ERYTHROCYTE [DISTWIDTH] IN BLOOD BY AUTOMATED COUNT: 14 % (ref 11.5–14.5)
GLUCOSE SERPL-MCNC: 111 MG/DL (ref 74–99)
HCT VFR BLD AUTO: 45.8 % (ref 36–46)
HGB BLD-MCNC: 15.3 G/DL (ref 12–16)
IMM GRANULOCYTES # BLD AUTO: 0.09 X10*3/UL (ref 0–0.7)
IMM GRANULOCYTES NFR BLD AUTO: 0.6 % (ref 0–0.9)
LYMPHOCYTES # BLD AUTO: 1.49 X10*3/UL (ref 1.2–4.8)
LYMPHOCYTES NFR BLD AUTO: 9.7 %
MAGNESIUM SERPL-MCNC: 1.93 MG/DL (ref 1.6–2.4)
MCH RBC QN AUTO: 31.5 PG (ref 26–34)
MCHC RBC AUTO-ENTMCNC: 33.4 G/DL (ref 32–36)
MCV RBC AUTO: 94 FL (ref 80–100)
MONOCYTES # BLD AUTO: 1.09 X10*3/UL (ref 0.1–1)
MONOCYTES NFR BLD AUTO: 7.1 %
NEUTROPHILS # BLD AUTO: 12.68 X10*3/UL (ref 1.2–7.7)
NEUTROPHILS NFR BLD AUTO: 82 %
NRBC BLD-RTO: 0 /100 WBCS (ref 0–0)
PLATELET # BLD AUTO: 291 X10*3/UL (ref 150–450)
POTASSIUM SERPL-SCNC: 3.9 MMOL/L (ref 3.5–5.3)
PROT SERPL-MCNC: 7.2 G/DL (ref 6.4–8.2)
RBC # BLD AUTO: 4.86 X10*6/UL (ref 4–5.2)
RH FACTOR (ANTIGEN D): NORMAL
SODIUM SERPL-SCNC: 137 MMOL/L (ref 136–145)
WBC # BLD AUTO: 15.4 X10*3/UL (ref 4.4–11.3)

## 2024-07-07 PROCEDURE — 99223 1ST HOSP IP/OBS HIGH 75: CPT

## 2024-07-07 PROCEDURE — 99222 1ST HOSP IP/OBS MODERATE 55: CPT | Performed by: STUDENT IN AN ORGANIZED HEALTH CARE EDUCATION/TRAINING PROGRAM

## 2024-07-07 PROCEDURE — 36415 COLL VENOUS BLD VENIPUNCTURE: CPT | Performed by: STUDENT IN AN ORGANIZED HEALTH CARE EDUCATION/TRAINING PROGRAM

## 2024-07-07 PROCEDURE — 85025 COMPLETE CBC W/AUTO DIFF WBC: CPT | Performed by: STUDENT IN AN ORGANIZED HEALTH CARE EDUCATION/TRAINING PROGRAM

## 2024-07-07 PROCEDURE — 96376 TX/PRO/DX INJ SAME DRUG ADON: CPT

## 2024-07-07 PROCEDURE — 80053 COMPREHEN METABOLIC PANEL: CPT | Performed by: STUDENT IN AN ORGANIZED HEALTH CARE EDUCATION/TRAINING PROGRAM

## 2024-07-07 PROCEDURE — 83735 ASSAY OF MAGNESIUM: CPT | Performed by: STUDENT IN AN ORGANIZED HEALTH CARE EDUCATION/TRAINING PROGRAM

## 2024-07-07 PROCEDURE — 96374 THER/PROPH/DIAG INJ IV PUSH: CPT

## 2024-07-07 PROCEDURE — 73700 CT LOWER EXTREMITY W/O DYE: CPT | Mod: RIGHT SIDE | Performed by: RADIOLOGY

## 2024-07-07 PROCEDURE — 73700 CT LOWER EXTREMITY W/O DYE: CPT | Mod: RT

## 2024-07-07 PROCEDURE — 2500000001 HC RX 250 WO HCPCS SELF ADMINISTERED DRUGS (ALT 637 FOR MEDICARE OP)

## 2024-07-07 PROCEDURE — 99285 EMERGENCY DEPT VISIT HI MDM: CPT | Mod: 25

## 2024-07-07 PROCEDURE — 2500000004 HC RX 250 GENERAL PHARMACY W/ HCPCS (ALT 636 FOR OP/ED)

## 2024-07-07 PROCEDURE — 1210000001 HC SEMI-PRIVATE ROOM DAILY

## 2024-07-07 PROCEDURE — 2500000004 HC RX 250 GENERAL PHARMACY W/ HCPCS (ALT 636 FOR OP/ED): Performed by: STUDENT IN AN ORGANIZED HEALTH CARE EDUCATION/TRAINING PROGRAM

## 2024-07-07 PROCEDURE — 86901 BLOOD TYPING SEROLOGIC RH(D): CPT | Performed by: STUDENT IN AN ORGANIZED HEALTH CARE EDUCATION/TRAINING PROGRAM

## 2024-07-07 RX ORDER — ACETAMINOPHEN 325 MG/1
650 TABLET ORAL EVERY 4 HOURS PRN
Status: DISCONTINUED | OUTPATIENT
Start: 2024-07-07 | End: 2024-07-10 | Stop reason: HOSPADM

## 2024-07-07 RX ORDER — BISACODYL 10 MG/1
10 SUPPOSITORY RECTAL DAILY PRN
Status: DISCONTINUED | OUTPATIENT
Start: 2024-07-07 | End: 2024-07-10 | Stop reason: HOSPADM

## 2024-07-07 RX ORDER — AMLODIPINE BESYLATE 10 MG/1
10 TABLET ORAL DAILY
Status: DISCONTINUED | OUTPATIENT
Start: 2024-07-07 | End: 2024-07-10 | Stop reason: HOSPADM

## 2024-07-07 RX ORDER — MORPHINE SULFATE 4 MG/ML
4 INJECTION INTRAVENOUS ONCE
Status: COMPLETED | OUTPATIENT
Start: 2024-07-07 | End: 2024-07-07

## 2024-07-07 RX ORDER — HEPARIN SODIUM 5000 [USP'U]/ML
5000 INJECTION, SOLUTION INTRAVENOUS; SUBCUTANEOUS EVERY 8 HOURS SCHEDULED
Status: DISCONTINUED | OUTPATIENT
Start: 2024-07-07 | End: 2024-07-09

## 2024-07-07 RX ORDER — ASPIRIN 81 MG/1
81 TABLET ORAL DAILY
Status: DISCONTINUED | OUTPATIENT
Start: 2024-07-07 | End: 2024-07-10 | Stop reason: HOSPADM

## 2024-07-07 RX ORDER — OXYCODONE HYDROCHLORIDE 5 MG/1
5 TABLET ORAL EVERY 4 HOURS PRN
Status: DISCONTINUED | OUTPATIENT
Start: 2024-07-07 | End: 2024-07-07

## 2024-07-07 RX ORDER — ONDANSETRON HYDROCHLORIDE 2 MG/ML
4 INJECTION, SOLUTION INTRAVENOUS EVERY 6 HOURS PRN
Status: DISCONTINUED | OUTPATIENT
Start: 2024-07-07 | End: 2024-07-10 | Stop reason: HOSPADM

## 2024-07-07 RX ORDER — BISACODYL 5 MG
10 TABLET, DELAYED RELEASE (ENTERIC COATED) ORAL DAILY PRN
Status: DISCONTINUED | OUTPATIENT
Start: 2024-07-07 | End: 2024-07-10 | Stop reason: HOSPADM

## 2024-07-07 RX ORDER — DOCUSATE SODIUM 100 MG/1
100 CAPSULE, LIQUID FILLED ORAL 2 TIMES DAILY
Status: DISCONTINUED | OUTPATIENT
Start: 2024-07-08 | End: 2024-07-10 | Stop reason: HOSPADM

## 2024-07-07 RX ORDER — OXYCODONE HYDROCHLORIDE 10 MG/1
10 TABLET ORAL EVERY 4 HOURS PRN
Status: DISCONTINUED | OUTPATIENT
Start: 2024-07-07 | End: 2024-07-07

## 2024-07-07 RX ORDER — SODIUM CHLORIDE, SODIUM LACTATE, POTASSIUM CHLORIDE, CALCIUM CHLORIDE 600; 310; 30; 20 MG/100ML; MG/100ML; MG/100ML; MG/100ML
75 INJECTION, SOLUTION INTRAVENOUS CONTINUOUS
Status: DISCONTINUED | OUTPATIENT
Start: 2024-07-07 | End: 2024-07-08

## 2024-07-07 RX ORDER — LOSARTAN POTASSIUM 50 MG/1
100 TABLET ORAL DAILY
Status: DISCONTINUED | OUTPATIENT
Start: 2024-07-07 | End: 2024-07-10 | Stop reason: HOSPADM

## 2024-07-07 RX ORDER — MORPHINE SULFATE 2 MG/ML
2 INJECTION, SOLUTION INTRAMUSCULAR; INTRAVENOUS EVERY 4 HOURS PRN
Status: DISCONTINUED | OUTPATIENT
Start: 2024-07-07 | End: 2024-07-09

## 2024-07-07 RX ORDER — PANTOPRAZOLE SODIUM 40 MG/10ML
40 INJECTION, POWDER, LYOPHILIZED, FOR SOLUTION INTRAVENOUS
Status: DISCONTINUED | OUTPATIENT
Start: 2024-07-08 | End: 2024-07-10 | Stop reason: HOSPADM

## 2024-07-07 RX ORDER — TALC
3 POWDER (GRAM) TOPICAL NIGHTLY PRN
Status: DISCONTINUED | OUTPATIENT
Start: 2024-07-07 | End: 2024-07-10 | Stop reason: HOSPADM

## 2024-07-07 RX ORDER — PANTOPRAZOLE SODIUM 40 MG/1
40 TABLET, DELAYED RELEASE ORAL
Status: DISCONTINUED | OUTPATIENT
Start: 2024-07-08 | End: 2024-07-10 | Stop reason: HOSPADM

## 2024-07-07 RX ORDER — MORPHINE SULFATE 4 MG/ML
4 INJECTION INTRAVENOUS EVERY 4 HOURS PRN
Status: DISCONTINUED | OUTPATIENT
Start: 2024-07-07 | End: 2024-07-09

## 2024-07-07 RX ORDER — GUAIFENESIN 600 MG/1
600 TABLET, EXTENDED RELEASE ORAL EVERY 12 HOURS PRN
Status: DISCONTINUED | OUTPATIENT
Start: 2024-07-07 | End: 2024-07-10 | Stop reason: HOSPADM

## 2024-07-07 RX ORDER — POLYETHYLENE GLYCOL 3350 17 G/17G
17 POWDER, FOR SOLUTION ORAL DAILY
Status: DISCONTINUED | OUTPATIENT
Start: 2024-07-07 | End: 2024-07-10 | Stop reason: HOSPADM

## 2024-07-07 RX ORDER — METOPROLOL SUCCINATE 25 MG/1
25 TABLET, EXTENDED RELEASE ORAL DAILY
Status: DISCONTINUED | OUTPATIENT
Start: 2024-07-07 | End: 2024-07-10 | Stop reason: HOSPADM

## 2024-07-07 RX ORDER — LEVOTHYROXINE SODIUM 125 UG/1
125 TABLET ORAL DAILY
Status: DISCONTINUED | OUTPATIENT
Start: 2024-07-07 | End: 2024-07-10 | Stop reason: HOSPADM

## 2024-07-07 RX ADMIN — MORPHINE SULFATE 4 MG: 4 INJECTION INTRAVENOUS at 16:55

## 2024-07-07 RX ADMIN — SODIUM CHLORIDE, POTASSIUM CHLORIDE, SODIUM LACTATE AND CALCIUM CHLORIDE 75 ML/HR: 600; 310; 30; 20 INJECTION, SOLUTION INTRAVENOUS at 21:34

## 2024-07-07 RX ADMIN — Medication 3 MG: at 21:41

## 2024-07-07 RX ADMIN — HEPARIN SODIUM 5000 UNITS: 5000 INJECTION INTRAVENOUS; SUBCUTANEOUS at 21:43

## 2024-07-07 RX ADMIN — POLYETHYLENE GLYCOL 3350 17 G: 17 POWDER, FOR SOLUTION ORAL at 21:41

## 2024-07-07 RX ADMIN — METOPROLOL SUCCINATE 25 MG: 25 TABLET, EXTENDED RELEASE ORAL at 21:40

## 2024-07-07 RX ADMIN — MORPHINE SULFATE 4 MG: 4 INJECTION INTRAVENOUS at 19:20

## 2024-07-07 RX ADMIN — HYDROMORPHONE HYDROCHLORIDE 0.2 MG: 0.5 INJECTION, SOLUTION INTRAMUSCULAR; INTRAVENOUS; SUBCUTANEOUS at 21:41

## 2024-07-07 ASSESSMENT — ENCOUNTER SYMPTOMS
FLANK PAIN: 0
CONSTIPATION: 0
FATIGUE: 0
ABDOMINAL PAIN: 0
HEADACHES: 0
JOINT SWELLING: 0
TREMORS: 0
COUGH: 0
CHILLS: 0
BACK PAIN: 0
WOUND: 0
HEMATURIA: 0
SHORTNESS OF BREATH: 0
VOMITING: 0
DIARRHEA: 0
WEAKNESS: 0
FEVER: 0
CHEST TIGHTNESS: 0
NAUSEA: 0
WHEEZING: 0
PALPITATIONS: 0

## 2024-07-07 ASSESSMENT — PAIN SCALES - GENERAL
PAINLEVEL_OUTOF10: 10 - WORST POSSIBLE PAIN
PAINLEVEL_OUTOF10: 6
PAINLEVEL_OUTOF10: 2
PAINLEVEL_OUTOF10: 0 - NO PAIN
PAINLEVEL_OUTOF10: 6
PAINLEVEL_OUTOF10: 0 - NO PAIN

## 2024-07-07 ASSESSMENT — PAIN DESCRIPTION - DESCRIPTORS: DESCRIPTORS: ACHING

## 2024-07-07 ASSESSMENT — PAIN SCALES - WONG BAKER: WONGBAKER_NUMERICALRESPONSE: HURTS EVEN MORE

## 2024-07-07 ASSESSMENT — LIFESTYLE VARIABLES
HAVE PEOPLE ANNOYED YOU BY CRITICIZING YOUR DRINKING: NO
EVER HAD A DRINK FIRST THING IN THE MORNING TO STEADY YOUR NERVES TO GET RID OF A HANGOVER: NO
TOTAL SCORE: 0
EVER FELT BAD OR GUILTY ABOUT YOUR DRINKING: NO
HAVE YOU EVER FELT YOU SHOULD CUT DOWN ON YOUR DRINKING: NO

## 2024-07-07 ASSESSMENT — PAIN DESCRIPTION - LOCATION
LOCATION: HIP
LOCATION: HIP

## 2024-07-07 ASSESSMENT — ACTIVITIES OF DAILY LIVING (ADL)
TOILETING: INDEPENDENT
JUDGMENT_ADEQUATE_SAFELY_COMPLETE_DAILY_ACTIVITIES: YES
ADEQUATE_TO_COMPLETE_ADL: YES
HEARING - LEFT EAR: FUNCTIONAL
WALKS IN HOME: INDEPENDENT
FEEDING YOURSELF: INDEPENDENT
GROOMING: INDEPENDENT
BATHING: INDEPENDENT
HEARING - RIGHT EAR: FUNCTIONAL
PATIENT'S MEMORY ADEQUATE TO SAFELY COMPLETE DAILY ACTIVITIES?: YES
DRESSING YOURSELF: INDEPENDENT

## 2024-07-07 ASSESSMENT — PAIN - FUNCTIONAL ASSESSMENT
PAIN_FUNCTIONAL_ASSESSMENT: 0-10

## 2024-07-07 ASSESSMENT — PAIN DESCRIPTION - PAIN TYPE: TYPE: ACUTE PAIN

## 2024-07-07 ASSESSMENT — PAIN DESCRIPTION - ORIENTATION: ORIENTATION: RIGHT

## 2024-07-07 NOTE — ED PROVIDER NOTES
"HPI   Chief Complaint   Patient presents with    Hip Pain     C/O right hip pain after stepping into a hole and falling.       HPI    Patient is a 67 yo female presenting with acute right hip pain after a fall in a parking lot this morning. States she stepped into a small hole in a parking lot around 10 am and fell onto her right hip. Denies trauma to head. Reports pain at rest is 0/10, however with any movement it is \"off the charts\" and of a sharp quality. Denies chest pain, SOB, and back pain. She did have an x-ray taken at [facility] which was inconclusive and recommended she receive a CT image of the right pelvis. Denies previous injury to area as well as osteoporosis.      ROS: Complete 12 point review of systems performed, otherwise negative except as noted in the history of present illness    PMH: Reviewed, documented below in note. Pertinents in HPI  PSH: Reviewed and documented below in note. Pertinents in HPI  SH: No tobacco alcohol or illicits   Fam: Reviewed, noncontributory to patients current complaint  MEDS: Reviewed and documented below in note. Pertinents in HPI  ALLERGIES: Reviewed and documented below in note.                  No data recorded                   Patient History   Past Medical History:   Diagnosis Date    Acute bronchospasm 09/04/2014    Acute bronchospasm    Anxiety disorder, unspecified 12/08/2013    Anxiety disorder    Asymptomatic menopausal state 12/08/2013    Menopause    Constipation, unspecified 12/08/2013    Constipation    Cough, unspecified 09/01/2015    Cough    Depression, unspecified 12/08/2013    Depression    Dermatitis, unspecified 12/08/2013    Dermatitis, eczematoid    Impacted cerumen, left ear 05/07/2017    Impacted cerumen of left ear    Other abnormalities of breathing 12/08/2013    Difficulty breathing    Pain in leg, unspecified 12/08/2013    Lower extremity pain    Palpitations 12/08/2013    Palpitations    Primary osteoarthritis, unspecified ankle and foot " 2013    Osteoarthritis of ankle or foot    Urticaria, unspecified 2013    Urticaria     Past Surgical History:   Procedure Laterality Date    MR HEAD ANGIO WO IV CONTRAST  2022    MR HEAD ANGIO WO IV CONTRAST 2022 [facility] CLINICAL LEGAC[facility]    MR NECK ANGIO WO IV CONTRAST  2022    MR NECK ANGIO WO IV CONTRAST 2022 [facility] CLINICAL LEGACY    OTHER SURGICAL HISTORY  07/15/2022    Heart surgery    OTHER SURGICAL HISTORY  10/24/2019     section     Family History   Problem Relation Name Age of Onset    Colon cancer Mother      Hypertension Father      Testicular cancer Father      Colon cancer Brother      Schizophrenia Son       Social History     Tobacco Use    Smoking status: Former     Current packs/day: 0.00     Types: Cigarettes     Quit date:      Years since quittin.5    Smokeless tobacco: Never   Substance Use Topics    Alcohol use: Not on file    Drug use: Not on file       Physical Exam   ED Triage Vitals   Temperature Heart Rate Respirations BP   24 1552 24 1552 24 1552 24 1553   36.7 °C (98 °F) 72 20 (!) 168/92      Pulse Ox Temp Source Heart Rate Source Patient Position   24 1552 24 1552 -- 24 1552   98 % Tympanic  Sitting      BP Location FiO2 (%)     24 1552 24 1552     Left arm 21 %       Physical Exam  Constitutional:       General: She is not in acute distress.     Appearance: Normal appearance.   Cardiovascular:      Rate and Rhythm: Normal rate and regular rhythm.   Pulmonary:      Effort: Pulmonary effort is normal.      Breath sounds: Normal breath sounds.   Musculoskeletal:         General: Signs of injury present.      Comments: Right hip   Neurological:      General: No focal deficit present.      Mental Status: She is alert and oriented to person, place, and time.   Psychiatric:         Mood and Affect: Mood normal.         Behavior: Behavior normal.         ED Course & MDM   ED Course as of 24  1943   Delaney Jul 07, 2024 1912 CT confirmed a right subcapital neck fracture.  Discussed with orthopedics Dr. Kovacs who agrees with admission to medicine and recommended patient be kept n.p.o. at midnight.  [facility] paged. []   1942 Spoke with [facility] team who agrees with admission to medicine floor.  [DR]      ED Course User Index  [DR] Renee Vivar, DO         Diagnoses as of 07/07/24 1943   Closed subcapital fracture of right femur, initial encounter (Multi)       Assessment:  1) Right Hip Fracture  -confirmed via CT right hip wo contrast  -ortho consulted  -admission to medicine     Disposition / Plan: [ ]   Condition at disposition: Stable.  Admission to medicine    Medical Decision Making    The x-ray obtained by Lalo STRONG did not exclude a fracture of the right femoral neck. Orthopedic surgery consulted to review x-ray image and it was recommended that a CT right hip wo contrast be ordered. Morphine given for pain management. Her WBC was 15.4 and neutrophils 12.68, which is also consistent with an inflammatory response after fracture. CT right hip confirmed a subcapital fracture of the right femoral neck . Admission to medicine for further orthopedic intervention and NPO diet initiated.    Procedure  Procedures     Laura Forrester  07/07/24 1941       Laura Forrester  07/07/24 1943

## 2024-07-08 ENCOUNTER — ANESTHESIA EVENT (OUTPATIENT)
Dept: OPERATING ROOM | Facility: HOSPITAL | Age: 69
End: 2024-07-08
Payer: MEDICARE

## 2024-07-08 ENCOUNTER — APPOINTMENT (OUTPATIENT)
Dept: CARDIOLOGY | Facility: HOSPITAL | Age: 69
End: 2024-07-08
Payer: MEDICARE

## 2024-07-08 ENCOUNTER — ANESTHESIA (OUTPATIENT)
Dept: OPERATING ROOM | Facility: HOSPITAL | Age: 69
End: 2024-07-08
Payer: MEDICARE

## 2024-07-08 ENCOUNTER — APPOINTMENT (OUTPATIENT)
Dept: RADIOLOGY | Facility: HOSPITAL | Age: 69
End: 2024-07-08
Payer: MEDICARE

## 2024-07-08 LAB
ALBUMIN SERPL BCP-MCNC: 3.5 G/DL (ref 3.4–5)
ALP SERPL-CCNC: 69 U/L (ref 33–136)
ALT SERPL W P-5'-P-CCNC: 13 U/L (ref 7–45)
ANION GAP SERPL CALC-SCNC: 11 MMOL/L (ref 10–20)
AST SERPL W P-5'-P-CCNC: 18 U/L (ref 9–39)
ATRIAL RATE: 60 BPM
BASOPHILS # BLD AUTO: 0.05 X10*3/UL (ref 0–0.1)
BASOPHILS NFR BLD AUTO: 0.6 %
BILIRUB SERPL-MCNC: 0.7 MG/DL (ref 0–1.2)
BUN SERPL-MCNC: 10 MG/DL (ref 6–23)
CALCIUM SERPL-MCNC: 8.2 MG/DL (ref 8.6–10.3)
CHLORIDE SERPL-SCNC: 104 MMOL/L (ref 98–107)
CO2 SERPL-SCNC: 26 MMOL/L (ref 21–32)
CREAT SERPL-MCNC: 0.69 MG/DL (ref 0.5–1.05)
EGFRCR SERPLBLD CKD-EPI 2021: >90 ML/MIN/1.73M*2
EOSINOPHIL # BLD AUTO: 0.13 X10*3/UL (ref 0–0.7)
EOSINOPHIL NFR BLD AUTO: 1.5 %
ERYTHROCYTE [DISTWIDTH] IN BLOOD BY AUTOMATED COUNT: 14 % (ref 11.5–14.5)
EST. AVERAGE GLUCOSE BLD GHB EST-MCNC: 111 MG/DL
GLUCOSE SERPL-MCNC: 85 MG/DL (ref 74–99)
HBA1C MFR BLD: 5.5 %
HCT VFR BLD AUTO: 42.2 % (ref 36–46)
HGB BLD-MCNC: 14.1 G/DL (ref 12–16)
IMM GRANULOCYTES # BLD AUTO: 0.04 X10*3/UL (ref 0–0.7)
IMM GRANULOCYTES NFR BLD AUTO: 0.5 % (ref 0–0.9)
LYMPHOCYTES # BLD AUTO: 1.24 X10*3/UL (ref 1.2–4.8)
LYMPHOCYTES NFR BLD AUTO: 14 %
MAGNESIUM SERPL-MCNC: 1.7 MG/DL (ref 1.6–2.4)
MCH RBC QN AUTO: 31.5 PG (ref 26–34)
MCHC RBC AUTO-ENTMCNC: 33.4 G/DL (ref 32–36)
MCV RBC AUTO: 94 FL (ref 80–100)
MONOCYTES # BLD AUTO: 0.62 X10*3/UL (ref 0.1–1)
MONOCYTES NFR BLD AUTO: 7 %
NEUTROPHILS # BLD AUTO: 6.75 X10*3/UL (ref 1.2–7.7)
NEUTROPHILS NFR BLD AUTO: 76.4 %
NRBC BLD-RTO: 0 /100 WBCS (ref 0–0)
P AXIS: 53 DEGREES
PLATELET # BLD AUTO: 252 X10*3/UL (ref 150–450)
POTASSIUM SERPL-SCNC: 3.6 MMOL/L (ref 3.5–5.3)
PR INTERVAL: 205 MS
PROT SERPL-MCNC: 5.9 G/DL (ref 6.4–8.2)
Q ONSET: 252 MS
QRS COUNT: 10 BEATS
QRS DURATION: 88 MS
QT INTERVAL: 420 MS
QTC CALCULATION(BAZETT): 413 MS
QTC FREDERICIA: 415 MS
R AXIS: 60 DEGREES
RBC # BLD AUTO: 4.48 X10*6/UL (ref 4–5.2)
SODIUM SERPL-SCNC: 137 MMOL/L (ref 136–145)
T AXIS: 81 DEGREES
T OFFSET: 462 MS
VENTRICULAR RATE: 58 BPM
WBC # BLD AUTO: 8.8 X10*3/UL (ref 4.4–11.3)

## 2024-07-08 PROCEDURE — 80053 COMPREHEN METABOLIC PANEL: CPT

## 2024-07-08 PROCEDURE — 3600000009 HC OR TIME - EACH INCREMENTAL 1 MINUTE - PROCEDURE LEVEL FOUR: Performed by: SPECIALIST

## 2024-07-08 PROCEDURE — 27235 TREAT THIGH FRACTURE: CPT | Performed by: SPECIALIST

## 2024-07-08 PROCEDURE — A4649 SURGICAL SUPPLIES: HCPCS | Performed by: SPECIALIST

## 2024-07-08 PROCEDURE — 2500000001 HC RX 250 WO HCPCS SELF ADMINISTERED DRUGS (ALT 637 FOR MEDICARE OP): Performed by: SPECIALIST

## 2024-07-08 PROCEDURE — 2781000 HC OR 278 NO HCPCS: Performed by: SPECIALIST

## 2024-07-08 PROCEDURE — 93005 ELECTROCARDIOGRAM TRACING: CPT

## 2024-07-08 PROCEDURE — 2500000004 HC RX 250 GENERAL PHARMACY W/ HCPCS (ALT 636 FOR OP/ED): Performed by: PHYSICIAN ASSISTANT

## 2024-07-08 PROCEDURE — 83036 HEMOGLOBIN GLYCOSYLATED A1C: CPT

## 2024-07-08 PROCEDURE — 36415 COLL VENOUS BLD VENIPUNCTURE: CPT

## 2024-07-08 PROCEDURE — 7100000002 HC RECOVERY ROOM TIME - EACH INCREMENTAL 1 MINUTE: Performed by: SPECIALIST

## 2024-07-08 PROCEDURE — 0QS606Z REPOSITION RIGHT UPPER FEMUR WITH INTRAMEDULLARY INTERNAL FIXATION DEVICE, OPEN APPROACH: ICD-10-PCS | Performed by: SPECIALIST

## 2024-07-08 PROCEDURE — 2500000004 HC RX 250 GENERAL PHARMACY W/ HCPCS (ALT 636 FOR OP/ED): Performed by: SPECIALIST

## 2024-07-08 PROCEDURE — 7100000001 HC RECOVERY ROOM TIME - INITIAL BASE CHARGE: Performed by: SPECIALIST

## 2024-07-08 PROCEDURE — 3600000004 HC OR TIME - INITIAL BASE CHARGE - PROCEDURE LEVEL FOUR: Performed by: SPECIALIST

## 2024-07-08 PROCEDURE — 99233 SBSQ HOSP IP/OBS HIGH 50: CPT | Performed by: PHYSICIAN ASSISTANT

## 2024-07-08 PROCEDURE — 83735 ASSAY OF MAGNESIUM: CPT | Performed by: PHYSICIAN ASSISTANT

## 2024-07-08 PROCEDURE — 2500000005 HC RX 250 GENERAL PHARMACY W/O HCPCS: Performed by: NURSE ANESTHETIST, CERTIFIED REGISTERED

## 2024-07-08 PROCEDURE — 2500000004 HC RX 250 GENERAL PHARMACY W/ HCPCS (ALT 636 FOR OP/ED): Performed by: NURSE ANESTHETIST, CERTIFIED REGISTERED

## 2024-07-08 PROCEDURE — 2500000001 HC RX 250 WO HCPCS SELF ADMINISTERED DRUGS (ALT 637 FOR MEDICARE OP)

## 2024-07-08 PROCEDURE — C1713 ANCHOR/SCREW BN/BN,TIS/BN: HCPCS | Performed by: SPECIALIST

## 2024-07-08 PROCEDURE — 3700000001 HC GENERAL ANESTHESIA TIME - INITIAL BASE CHARGE: Performed by: SPECIALIST

## 2024-07-08 PROCEDURE — 3700000002 HC GENERAL ANESTHESIA TIME - EACH INCREMENTAL 1 MINUTE: Performed by: SPECIALIST

## 2024-07-08 PROCEDURE — 2500000001 HC RX 250 WO HCPCS SELF ADMINISTERED DRUGS (ALT 637 FOR MEDICARE OP): Performed by: STUDENT IN AN ORGANIZED HEALTH CARE EDUCATION/TRAINING PROGRAM

## 2024-07-08 PROCEDURE — 2500000004 HC RX 250 GENERAL PHARMACY W/ HCPCS (ALT 636 FOR OP/ED): Performed by: STUDENT IN AN ORGANIZED HEALTH CARE EDUCATION/TRAINING PROGRAM

## 2024-07-08 PROCEDURE — C1769 GUIDE WIRE: HCPCS | Performed by: SPECIALIST

## 2024-07-08 PROCEDURE — 2780000003 HC OR 278 NO HCPCS: Performed by: SPECIALIST

## 2024-07-08 PROCEDURE — 2500000004 HC RX 250 GENERAL PHARMACY W/ HCPCS (ALT 636 FOR OP/ED): Performed by: ANESTHESIOLOGY

## 2024-07-08 PROCEDURE — 76000 FLUOROSCOPY <1 HR PHYS/QHP: CPT

## 2024-07-08 PROCEDURE — 2500000005 HC RX 250 GENERAL PHARMACY W/O HCPCS: Performed by: SPECIALIST

## 2024-07-08 PROCEDURE — 1210000001 HC SEMI-PRIVATE ROOM DAILY

## 2024-07-08 PROCEDURE — 85025 COMPLETE CBC W/AUTO DIFF WBC: CPT

## 2024-07-08 PROCEDURE — 93010 ELECTROCARDIOGRAM REPORT: CPT | Performed by: INTERNAL MEDICINE

## 2024-07-08 PROCEDURE — 2720000007 HC OR 272 NO HCPCS: Performed by: SPECIALIST

## 2024-07-08 PROCEDURE — 2500000004 HC RX 250 GENERAL PHARMACY W/ HCPCS (ALT 636 FOR OP/ED)

## 2024-07-08 DEVICE — IMPLANTABLE DEVICE: Type: IMPLANTABLE DEVICE | Site: HIP | Status: FUNCTIONAL

## 2024-07-08 DEVICE — PLATE, FERMOAL NECK, 1 HOLE, STERILE: Type: IMPLANTABLE DEVICE | Site: HIP | Status: FUNCTIONAL

## 2024-07-08 RX ORDER — BUPIVACAINE HCL/EPINEPHRINE 0.5-1:200K
VIAL (ML) INJECTION AS NEEDED
Status: DISCONTINUED | OUTPATIENT
Start: 2024-07-08 | End: 2024-07-08 | Stop reason: HOSPADM

## 2024-07-08 RX ORDER — METOCLOPRAMIDE HYDROCHLORIDE 5 MG/ML
10 INJECTION INTRAMUSCULAR; INTRAVENOUS ONCE
Status: COMPLETED | OUTPATIENT
Start: 2024-07-08 | End: 2024-07-08

## 2024-07-08 RX ORDER — TRANEXAMIC ACID 100 MG/ML
INJECTION, SOLUTION INTRAVENOUS AS NEEDED
Status: DISCONTINUED | OUTPATIENT
Start: 2024-07-08 | End: 2024-07-08

## 2024-07-08 RX ORDER — PROPOFOL 10 MG/ML
INJECTION, EMULSION INTRAVENOUS AS NEEDED
Status: DISCONTINUED | OUTPATIENT
Start: 2024-07-08 | End: 2024-07-08

## 2024-07-08 RX ORDER — ONDANSETRON HYDROCHLORIDE 2 MG/ML
4 INJECTION, SOLUTION INTRAVENOUS ONCE AS NEEDED
Status: DISCONTINUED | OUTPATIENT
Start: 2024-07-08 | End: 2024-07-08 | Stop reason: HOSPADM

## 2024-07-08 RX ORDER — SODIUM CHLORIDE 9 MG/ML
100 INJECTION, SOLUTION INTRAVENOUS CONTINUOUS
Status: ACTIVE | OUTPATIENT
Start: 2024-07-08 | End: 2024-07-09

## 2024-07-08 RX ORDER — MORPHINE SULFATE 10 MG/ML
INJECTION, SOLUTION INTRAMUSCULAR; INTRAVENOUS AS NEEDED
Status: DISCONTINUED | OUTPATIENT
Start: 2024-07-08 | End: 2024-07-08

## 2024-07-08 RX ORDER — MORPHINE SULFATE 4 MG/ML
4 INJECTION INTRAVENOUS EVERY 5 MIN PRN
Status: DISCONTINUED | OUTPATIENT
Start: 2024-07-08 | End: 2024-07-08 | Stop reason: HOSPADM

## 2024-07-08 RX ORDER — FENTANYL CITRATE 50 UG/ML
INJECTION, SOLUTION INTRAMUSCULAR; INTRAVENOUS AS NEEDED
Status: DISCONTINUED | OUTPATIENT
Start: 2024-07-08 | End: 2024-07-08

## 2024-07-08 RX ORDER — MORPHINE SULFATE 2 MG/ML
2 INJECTION, SOLUTION INTRAMUSCULAR; INTRAVENOUS EVERY 5 MIN PRN
Status: DISCONTINUED | OUTPATIENT
Start: 2024-07-08 | End: 2024-07-08 | Stop reason: HOSPADM

## 2024-07-08 RX ORDER — ONDANSETRON HYDROCHLORIDE 2 MG/ML
INJECTION, SOLUTION INTRAVENOUS AS NEEDED
Status: DISCONTINUED | OUTPATIENT
Start: 2024-07-08 | End: 2024-07-08

## 2024-07-08 RX ORDER — FAMOTIDINE 10 MG/ML
20 INJECTION INTRAVENOUS ONCE
Status: COMPLETED | OUTPATIENT
Start: 2024-07-08 | End: 2024-07-08

## 2024-07-08 RX ORDER — KETOROLAC TROMETHAMINE 30 MG/ML
INJECTION, SOLUTION INTRAMUSCULAR; INTRAVENOUS AS NEEDED
Status: DISCONTINUED | OUTPATIENT
Start: 2024-07-08 | End: 2024-07-08

## 2024-07-08 RX ORDER — ASPIRIN 325 MG
325 TABLET, DELAYED RELEASE (ENTERIC COATED) ORAL 2 TIMES DAILY
Status: DISCONTINUED | OUTPATIENT
Start: 2024-07-08 | End: 2024-07-10 | Stop reason: HOSPADM

## 2024-07-08 RX ORDER — SODIUM CHLORIDE, SODIUM LACTATE, POTASSIUM CHLORIDE, CALCIUM CHLORIDE 600; 310; 30; 20 MG/100ML; MG/100ML; MG/100ML; MG/100ML
50 INJECTION, SOLUTION INTRAVENOUS CONTINUOUS
Status: DISCONTINUED | OUTPATIENT
Start: 2024-07-08 | End: 2024-07-10 | Stop reason: HOSPADM

## 2024-07-08 RX ORDER — MAGNESIUM SULFATE HEPTAHYDRATE 40 MG/ML
2 INJECTION, SOLUTION INTRAVENOUS ONCE
Status: COMPLETED | OUTPATIENT
Start: 2024-07-08 | End: 2024-07-08

## 2024-07-08 RX ORDER — VANCOMYCIN HYDROCHLORIDE 1 G/200ML
1000 INJECTION, SOLUTION INTRAVENOUS ONCE
Status: COMPLETED | OUTPATIENT
Start: 2024-07-08 | End: 2024-07-08

## 2024-07-08 RX ORDER — MIDAZOLAM HYDROCHLORIDE 1 MG/ML
INJECTION, SOLUTION INTRAMUSCULAR; INTRAVENOUS AS NEEDED
Status: DISCONTINUED | OUTPATIENT
Start: 2024-07-08 | End: 2024-07-08

## 2024-07-08 RX ORDER — ROCURONIUM BROMIDE 10 MG/ML
INJECTION, SOLUTION INTRAVENOUS AS NEEDED
Status: DISCONTINUED | OUTPATIENT
Start: 2024-07-08 | End: 2024-07-08

## 2024-07-08 RX ADMIN — MORPHINE SULFATE 4 MG: 4 INJECTION INTRAVENOUS at 05:50

## 2024-07-08 RX ADMIN — ASPIRIN 325 MG: 325 TABLET, COATED ORAL at 21:01

## 2024-07-08 RX ADMIN — DOCUSATE SODIUM 100 MG: 100 CAPSULE, LIQUID FILLED ORAL at 21:01

## 2024-07-08 RX ADMIN — VANCOMYCIN HYDROCHLORIDE 1000 MG: 1 INJECTION, SOLUTION INTRAVENOUS at 11:34

## 2024-07-08 RX ADMIN — MORPHINE SULFATE 4 MG: 4 INJECTION INTRAVENOUS at 00:15

## 2024-07-08 RX ADMIN — GUAIFENESIN 600 MG: 600 TABLET ORAL at 21:15

## 2024-07-08 RX ADMIN — SODIUM CHLORIDE 100 ML/HR: 9 INJECTION, SOLUTION INTRAVENOUS at 15:47

## 2024-07-08 RX ADMIN — HEPARIN SODIUM 5000 UNITS: 5000 INJECTION INTRAVENOUS; SUBCUTANEOUS at 21:01

## 2024-07-08 RX ADMIN — METOPROLOL SUCCINATE 25 MG: 25 TABLET, EXTENDED RELEASE ORAL at 21:01

## 2024-07-08 RX ADMIN — HEPARIN SODIUM 5000 UNITS: 5000 INJECTION INTRAVENOUS; SUBCUTANEOUS at 05:50

## 2024-07-08 RX ADMIN — HEPARIN SODIUM 5000 UNITS: 5000 INJECTION INTRAVENOUS; SUBCUTANEOUS at 15:46

## 2024-07-08 RX ADMIN — FAMOTIDINE 20 MG: 10 INJECTION, SOLUTION INTRAVENOUS at 10:54

## 2024-07-08 RX ADMIN — METOCLOPRAMIDE 10 MG: 5 INJECTION, SOLUTION INTRAMUSCULAR; INTRAVENOUS at 10:54

## 2024-07-08 RX ADMIN — MORPHINE SULFATE 4 MG: 4 INJECTION INTRAVENOUS at 21:10

## 2024-07-08 RX ADMIN — LOSARTAN POTASSIUM 100 MG: 50 TABLET, FILM COATED ORAL at 10:06

## 2024-07-08 RX ADMIN — AMLODIPINE BESYLATE 10 MG: 10 TABLET ORAL at 10:06

## 2024-07-08 RX ADMIN — LEVOTHYROXINE SODIUM 125 MCG: 125 TABLET ORAL at 05:50

## 2024-07-08 RX ADMIN — MAGNESIUM SULFATE HEPTAHYDRATE 2 G: 40 INJECTION, SOLUTION INTRAVENOUS at 11:25

## 2024-07-08 RX ADMIN — SODIUM CHLORIDE, POTASSIUM CHLORIDE, SODIUM LACTATE AND CALCIUM CHLORIDE 50 ML/HR: 600; 310; 30; 20 INJECTION, SOLUTION INTRAVENOUS at 10:54

## 2024-07-08 RX ADMIN — MORPHINE SULFATE 4 MG: 4 INJECTION INTRAVENOUS at 10:06

## 2024-07-08 RX ADMIN — Medication 3 MG: at 21:01

## 2024-07-08 RX ADMIN — DOCUSATE SODIUM 100 MG: 100 CAPSULE, LIQUID FILLED ORAL at 10:06

## 2024-07-08 RX ADMIN — PANTOPRAZOLE SODIUM 40 MG: 40 TABLET, DELAYED RELEASE ORAL at 06:00

## 2024-07-08 SDOH — SOCIAL STABILITY: SOCIAL INSECURITY: DO YOU FEEL ANYONE HAS EXPLOITED OR TAKEN ADVANTAGE OF YOU FINANCIALLY OR OF YOUR PERSONAL PROPERTY?: NO

## 2024-07-08 SDOH — SOCIAL STABILITY: SOCIAL INSECURITY: ARE THERE ANY APPARENT SIGNS OF INJURIES/BEHAVIORS THAT COULD BE RELATED TO ABUSE/NEGLECT?: NO

## 2024-07-08 SDOH — HEALTH STABILITY: MENTAL HEALTH: CURRENT SMOKER: 0

## 2024-07-08 SDOH — SOCIAL STABILITY: SOCIAL INSECURITY: DOES ANYONE TRY TO KEEP YOU FROM HAVING/CONTACTING OTHER FRIENDS OR DOING THINGS OUTSIDE YOUR HOME?: NO

## 2024-07-08 SDOH — SOCIAL STABILITY: SOCIAL INSECURITY: ARE YOU OR HAVE YOU BEEN THREATENED OR ABUSED PHYSICALLY, EMOTIONALLY, OR SEXUALLY BY ANYONE?: NO

## 2024-07-08 SDOH — SOCIAL STABILITY: SOCIAL INSECURITY: HAS ANYONE EVER THREATENED TO HURT YOUR FAMILY OR YOUR PETS?: NO

## 2024-07-08 SDOH — SOCIAL STABILITY: SOCIAL INSECURITY: HAVE YOU HAD THOUGHTS OF HARMING ANYONE ELSE?: NO

## 2024-07-08 SDOH — SOCIAL STABILITY: SOCIAL INSECURITY: HAVE YOU HAD ANY THOUGHTS OF HARMING ANYONE ELSE?: NO

## 2024-07-08 SDOH — SOCIAL STABILITY: SOCIAL INSECURITY: ABUSE: ADULT

## 2024-07-08 SDOH — SOCIAL STABILITY: SOCIAL INSECURITY: WERE YOU ABLE TO COMPLETE ALL THE BEHAVIORAL HEALTH SCREENINGS?: YES

## 2024-07-08 SDOH — SOCIAL STABILITY: SOCIAL INSECURITY: DO YOU FEEL UNSAFE GOING BACK TO THE PLACE WHERE YOU ARE LIVING?: NO

## 2024-07-08 ASSESSMENT — COGNITIVE AND FUNCTIONAL STATUS - GENERAL
MOBILITY SCORE: 24
DAILY ACTIVITIY SCORE: 24
PATIENT BASELINE BEDBOUND: NO

## 2024-07-08 ASSESSMENT — LIFESTYLE VARIABLES
HOW OFTEN DO YOU HAVE 6 OR MORE DRINKS ON ONE OCCASION: NEVER
HOW MANY STANDARD DRINKS CONTAINING ALCOHOL DO YOU HAVE ON A TYPICAL DAY: 1 OR 2
AUDIT-C TOTAL SCORE: 2
AUDIT-C TOTAL SCORE: 2
PRESCIPTION_ABUSE_PAST_12_MONTHS: NO
SKIP TO QUESTIONS 9-10: 1
HOW OFTEN DO YOU HAVE A DRINK CONTAINING ALCOHOL: 2-4 TIMES A MONTH
SUBSTANCE_ABUSE_PAST_12_MONTHS: NO

## 2024-07-08 ASSESSMENT — COLUMBIA-SUICIDE SEVERITY RATING SCALE - C-SSRS
6. HAVE YOU EVER DONE ANYTHING, STARTED TO DO ANYTHING, OR PREPARED TO DO ANYTHING TO END YOUR LIFE?: NO
2. HAVE YOU ACTUALLY HAD ANY THOUGHTS OF KILLING YOURSELF?: NO
1. IN THE PAST MONTH, HAVE YOU WISHED YOU WERE DEAD OR WISHED YOU COULD GO TO SLEEP AND NOT WAKE UP?: NO

## 2024-07-08 ASSESSMENT — ENCOUNTER SYMPTOMS
WEAKNESS: 0
DIAPHORESIS: 0
ABDOMINAL PAIN: 0
LIGHT-HEADEDNESS: 0
JOINT SWELLING: 0
CONSTIPATION: 0
CHILLS: 0
CHEST TIGHTNESS: 0
NUMBNESS: 0
NAUSEA: 0
HALLUCINATIONS: 0
FACIAL SWELLING: 0
APPETITE CHANGE: 0
DYSURIA: 0
HEMATURIA: 0
FLANK PAIN: 0
COUGH: 0
PALPITATIONS: 0
BLOOD IN STOOL: 0
WHEEZING: 0
BRUISES/BLEEDS EASILY: 0
FEVER: 0
BACK PAIN: 0
FATIGUE: 0
DIZZINESS: 0
TROUBLE SWALLOWING: 0
SHORTNESS OF BREATH: 0
FREQUENCY: 0
WOUND: 0
HEADACHES: 0
SORE THROAT: 0
DIARRHEA: 0
VOMITING: 0
EYE PAIN: 0

## 2024-07-08 ASSESSMENT — PAIN SCALES - GENERAL
PAINLEVEL_OUTOF10: 9
PAINLEVEL_OUTOF10: 0 - NO PAIN
PAINLEVEL_OUTOF10: 0 - NO PAIN
PAIN_LEVEL: 0
PAINLEVEL_OUTOF10: 0 - NO PAIN
PAINLEVEL_OUTOF10: 2
PAINLEVEL_OUTOF10: 8
PAINLEVEL_OUTOF10: 0 - NO PAIN
PAINLEVEL_OUTOF10: 0 - NO PAIN

## 2024-07-08 ASSESSMENT — PATIENT HEALTH QUESTIONNAIRE - PHQ9
2. FEELING DOWN, DEPRESSED OR HOPELESS: NOT AT ALL
SUM OF ALL RESPONSES TO PHQ9 QUESTIONS 1 & 2: 0
1. LITTLE INTEREST OR PLEASURE IN DOING THINGS: NOT AT ALL

## 2024-07-08 ASSESSMENT — PAIN - FUNCTIONAL ASSESSMENT
PAIN_FUNCTIONAL_ASSESSMENT: 0-10

## 2024-07-08 ASSESSMENT — ACTIVITIES OF DAILY LIVING (ADL): LACK_OF_TRANSPORTATION: NO

## 2024-07-08 ASSESSMENT — PAIN SCALES - WONG BAKER: WONGBAKER_NUMERICALRESPONSE: HURTS WHOLE LOT

## 2024-07-08 NOTE — ANESTHESIA PREPROCEDURE EVALUATION
Patient: Jeanna Haynes    Procedure Information       Date/Time: 07/08/24 1050    Procedure: RIGHT ORIF Hip IM nail, FNS *C-ARM * SYNTHES * (Right: Hip)    Location: [facility] 07 / Virtual [facility]    Surgeons: Joel Jackson MD            Relevant Problems   Anesthesia (within normal limits)      Cardiac   (+) Cardiac murmur, unspecified   (+) Essential (primary) hypertension   (+) Hypertension   (+) Mitral regurgitation   (+) Mixed hyperlipidemia   (+) Nonrheumatic mitral (valve) insufficiency      Pulmonary (within normal limits)      Neuro   (+) Anxiety associated with depression   (+) Lumbar radiculopathy      GI (within normal limits)      /Renal (within normal limits)      Liver (within normal limits)      Endocrine   (+) Hypothyroidism      Hematology (within normal limits)      Musculoskeletal (within normal limits)      HEENT (within normal limits)      ID (within normal limits)      Skin (within normal limits)      GYN (within normal limits)       Clinical information reviewed:   Tobacco  Allergies  Meds  Problems  Med Hx  Surg Hx   Fam Hx  Soc   Hx        NPO Detail:  No data recorded     Physical Exam    Airway  Mallampati: II  TM distance: >3 FB     Cardiovascular - normal exam     Dental - normal exam     Pulmonary - normal exam     Abdominal - normal exam             Anesthesia Plan    History of general anesthesia?: yes  History of complications of general anesthesia?: no    ASA 3     general     The patient is not a current smoker.  Patient was not previously instructed to abstain from smoking on day of procedure.  Patient did not smoke on day of procedure.    intravenous induction   Anesthetic plan and risks discussed with patient.  Use of blood products discussed with patient who consented to blood products.    Plan discussed with CRNA.

## 2024-07-08 NOTE — PROGRESS NOTES
Occupational Therapy                 Therapy Communication Note    Patient Name: Jeanna Haynes  MRN: [id number]  Today's Date: 7/8/2024     Discipline: Occupational Therapy    Missed Visit Reason: Missed Visit Reason: Patient placed on medical hold    Missed Time: Attempted OT eval. Pt admit for hip fracture. Pending surgical intervention today. Defer therapy at this time and see once cleared for activity and WB post op.     Comment:

## 2024-07-08 NOTE — ANESTHESIA POSTPROCEDURE EVALUATION
Patient: Jeanna Haynes    Procedure Summary       Date: 07/08/24 Room / Location: [facility] / [facility]    Anesthesia Start: 1207 Anesthesia Stop: 1406    Procedure: RIGHT ORIF Hip IM nail, FNS *C-ARM * SYNTHES * (Right: Hip) Diagnosis:       Closed subcapital fracture of right femur, initial encounter (Multi)      (Closed subcapital fracture of right femur, initial encounter (Multi) [S72.011A])    Surgeons: Joel Jackson MD Responsible Provider: CARLA Varela    Anesthesia Type: general ASA Status: 3            Anesthesia Type: general    Vitals Value Taken Time   /91 07/08/24 1450   Temp 98.6 07/08/24 1515   Pulse 63 07/08/24 1450   Resp 18 07/08/24 1450   SpO2 94 % 07/08/24 1450   Vitals shown include unfiled device data.    Anesthesia Post Evaluation    Patient location during evaluation: PACU  Patient participation: complete - patient participated  Level of consciousness: awake and alert  Pain score: 0  Pain management: satisfactory to patient  Airway patency: patent  Cardiovascular status: hemodynamically stable  Respiratory status: room air  Hydration status: acceptable  Postoperative Nausea and Vomiting: none      There were no known notable events for this encounter.

## 2024-07-08 NOTE — CONSULTS
Reason For Consult  Right hip fracture    History Of Present Illness  Jeanna Haynes is a 68 y.o. female presenting with right hip pain after falling earlier today.  She has been unable to ambulate secondary to the pain.  Imaging did reveal a nondisplaced femoral neck fracture.  The patient does have a history of hypertension, as well as heart valve repair which is stable..     Past Medical History  She has a past medical history of Acute bronchospasm (09/04/2014), Anxiety disorder, unspecified (12/08/2013), Asymptomatic menopausal state (12/08/2013), Constipation, unspecified (12/08/2013), Cough, unspecified (09/01/2015), Depression, unspecified (12/08/2013), Dermatitis, unspecified (12/08/2013), Impacted cerumen, left ear (05/07/2017), Other abnormalities of breathing (12/08/2013), Pain in leg, unspecified (12/08/2013), Palpitations (12/08/2013), Primary osteoarthritis, unspecified ankle and foot (12/08/2013), and Urticaria, unspecified (12/08/2013).    Surgical History  She has a past surgical history that includes Other surgical history (07/15/2022); Other surgical history (10/24/2019); MR angio head wo IV contrast (5/25/2022); and MR angio neck wo IV contrast (5/25/2022).     Social History  She reports that she quit smoking about 46 years ago. Her smoking use included cigarettes. She has never used smokeless tobacco. No history on file for alcohol use and drug use.    Family History  Family History   Problem Relation Name Age of Onset    Colon cancer Mother      Hypertension Father      Testicular cancer Father      Colon cancer Brother      Schizophrenia Son          Allergies  Penicillins and Latex    Review of Systems  Negative as per HPI     Physical Exam  Physical Exam  Constitutional:       General: She is not in acute distress.     Appearance: Normal appearance.   Cardiovascular:      Rate and Rhythm: Normal rate and regular rhythm.   Pulmonary:      Effort: Pulmonary effort is normal.      Breath  "sounds: Normal breath sounds.   Musculoskeletal:         General: Signs of injury present.      Comments: Right hip externally rotated and shortened  Neurological:      General: No focal deficit present.      Mental Status: She is alert and oriented to person, place, and time.   Psychiatric:         Mood and Affect: Mood normal.         Behavior: Behavior normal.      Last Recorded Vitals  Blood pressure 129/74, pulse 64, temperature 36.1 °C (97 °F), temperature source Temporal, resp. rate 18, height 1.651 m (5' 5\"), weight 79.4 kg (175 lb), SpO2 94%.    Relevant Results      CT hip right wo IV contrast    Result Date: 7/7/2024  STUDY: CT Extremity; Completed Time:  7/7/2024 5:56 PM. INDICATION: Evaluate for right hip fracture. COMPARISON: None available. ACCESSION NUMBER(S): [id number] ORDERING CLINICIAN: DOMITILA OBRIEN TECHNIQUE:  Thin section axial images were obtained through the right hip without intravenous contrast.  Orthogonal reconstructed images were obtained and reviewed.  Automated mA/kV exposure control was utilized and patient examination was performed in strict accordance with principles of ALARA. FINDINGS: There is a subcapital fracture of the right femoral neck.  The femoral head itself is not displaced with respect to the acetabulum.  No acute osseous findings are seen in the proximal right femoral shaft.  No acute findings are seen within the right iliac wing or right pubic ramus.  There is minimal edema within the surrounding musculature.  No hematoma is seen within the right hemipelvis.  There are diverticula seen in the sigmoid colon.    1.  Subcapital neck fracture of the right hip. 2.  Sigmoid diverticulosis. Signed by Kevin Booth MD    Urgent Care Xray    Result Date: 7/7/2024  Interpreted By:  Beau Hernandez, STUDY: XR URGENT CARE XRAY;  7/7/2024 2:22 pm   INDICATION: Signs/Symptoms:right hip pain.   COMPARISON: None.   ACCESSION NUMBER(S): [id number]   ORDERING CLINICIAN: FELIX" HALL   TECHNIQUE: Two views of the right hip   FINDINGS: There is increased density along the subcapital region on the lateral side of the right femoral neck. The femoral neck is otherwise not well profiled. There is marginal osteophyte formation along the right femoral head neck junction. No dislocation is evident. No radiopaque foreign body is evident.       Although the increased density at the subcapital region of the right femoral neck may represent a collar of osteophytes, subcapital fracture of the right femoral neck is not excluded on the basis of these radiographs. Knowledge of any trauma may be helpful. CT is recommended for further assessment.   MACRO: None   Signed by: Beau Hernandez 7/7/2024 2:54 PM Dictation workstation:   [id number]         Scheduled medications  amLODIPine, 10 mg, oral, Daily  [Held by provider] aspirin, 81 mg, oral, Daily  [START ON 7/8/2024] docusate sodium, 100 mg, oral, BID  heparin (porcine), 5,000 Units, subcutaneous, q8h CHONG  levothyroxine, 125 mcg, oral, Daily  losartan, 100 mg, oral, Daily  metoprolol succinate XL, 25 mg, oral, Daily  [START ON 7/8/2024] pantoprazole, 40 mg, oral, Daily before breakfast   Or  [START ON 7/8/2024] pantoprazole, 40 mg, intravenous, Daily before breakfast  polyethylene glycol, 17 g, oral, Daily      Continuous medications  lactated Ringer's, 75 mL/hr      PRN medications  PRN medications: acetaminophen, bisacodyl, bisacodyl, guaiFENesin, HYDROmorphone, melatonin, morphine, morphine, ondansetron  Results for orders placed or performed during the hospital encounter of 07/07/24 (from the past 24 hour(s))   CBC and Auto Differential   Result Value Ref Range    WBC 15.4 (H) 4.4 - 11.3 x10*3/uL    nRBC 0.0 0.0 - 0.0 /100 WBCs    RBC 4.86 4.00 - 5.20 x10*6/uL    Hemoglobin 15.3 12.0 - 16.0 g/dL    Hematocrit 45.8 36.0 - 46.0 %    MCV 94 80 - 100 fL    MCH 31.5 26.0 - 34.0 pg    MCHC 33.4 32.0 - 36.0 g/dL    RDW 14.0 11.5 - 14.5 %    Platelets 291  150 - 450 x10*3/uL    Neutrophils % 82.0 40.0 - 80.0 %    Immature Granulocytes %, Automated 0.6 0.0 - 0.9 %    Lymphocytes % 9.7 13.0 - 44.0 %    Monocytes % 7.1 2.0 - 10.0 %    Eosinophils % 0.2 0.0 - 6.0 %    Basophils % 0.4 0.0 - 2.0 %    Neutrophils Absolute 12.68 (H) 1.20 - 7.70 x10*3/uL    Immature Granulocytes Absolute, Automated 0.09 0.00 - 0.70 x10*3/uL    Lymphocytes Absolute 1.49 1.20 - 4.80 x10*3/uL    Monocytes Absolute 1.09 (H) 0.10 - 1.00 x10*3/uL    Eosinophils Absolute 0.03 0.00 - 0.70 x10*3/uL    Basophils Absolute 0.06 0.00 - 0.10 x10*3/uL   Comprehensive metabolic panel   Result Value Ref Range    Glucose 111 (H) 74 - 99 mg/dL    Sodium 137 136 - 145 mmol/L    Potassium 3.9 3.5 - 5.3 mmol/L    Chloride 103 98 - 107 mmol/L    Bicarbonate 23 21 - 32 mmol/L    Anion Gap 15 10 - 20 mmol/L    Urea Nitrogen 17 6 - 23 mg/dL    Creatinine 0.81 0.50 - 1.05 mg/dL    eGFR 79 >60 mL/min/1.73m*2    Calcium 9.7 8.6 - 10.3 mg/dL    Albumin 4.3 3.4 - 5.0 g/dL    Alkaline Phosphatase 73 33 - 136 U/L    Total Protein 7.2 6.4 - 8.2 g/dL    AST 20 9 - 39 U/L    Bilirubin, Total 0.6 0.0 - 1.2 mg/dL    ALT 18 7 - 45 U/L   Magnesium   Result Value Ref Range    Magnesium 1.93 1.60 - 2.40 mg/dL   Type And Screen   Result Value Ref Range    ABO TYPE A     Rh TYPE POS     ANTIBODY SCREEN NEG         Assessment/Plan     68-year-old female with a right minimally displaced valgus impacted right hip fracture.  Low discussion with the patient with treatment options and diagnosis.  In the setting of a hip fracture of this pattern, this is deemed stable, but we still recommend surgical fixation so that the fracture does not displace and to allow for early ambulation.  From a nonoperative standpoint, the patient would be on crutches for multiple weeks and we have limited ambulation.  Therefore, recommend surgical fixation.  In this case this would be an open reduction internal fixation with either screws or the femoral neck  system.  Risk of surgery clued bleeding, infection, need for further surgery, failure of the hardware, damage to nerves arteries veins, persistent pain, loss of ambulatory status.  Risk of anesthesia include stroke, heart attack and even death.  We will work to get her scheduled within the next 48 hours.    - OR for right hip ORIF  -Nonweightbearing right lower extremity  -N.p.o. at midnight tonight  -Appreciate primary team care    Jesus Kovacs MD

## 2024-07-08 NOTE — ANESTHESIA PROCEDURE NOTES
Airway  Date/Time: 7/8/2024 12:14 PM  Urgency: elective    Airway not difficult    Staffing  Performed: CRNA   Authorized by: CARLA Varela    Performed by: CARLA Varela  Patient location during procedure: OR    Indications and Patient Condition  Indications for airway management: anesthesia  Spontaneous Ventilation: absent  Sedation level: deep  Preoxygenated: yes  Patient position: sniffing  Mask difficulty assessment: 2 - vent by mask + OA or adjuvant +/- NMBA  No planned trial extubation    Final Airway Details  Final airway type: endotracheal airway      Successful airway: ETT  Cuffed: yes   Successful intubation technique: video laryngoscopy  Facilitating devices/methods: intubating stylet and cricoid pressure  Blade: Cathy  Blade size: #3  ETT size (mm): 7.5  Cormack-Lehane Classification: grade I - full view of glottis  Placement verified by: chest auscultation and capnometry   Cuff volume (mL): 10  Measured from: teeth  ETT to teeth (cm): 21  Number of attempts at approach: 2  Ventilation between attempts: none  Number of other approaches attempted: 1    Other Attempts  Unsuccessful attempted endotracheal techniques: direct laryngoscopy    Additional Comments  Intubation with contemporary laryngoscopy unveiled an anterior airway with poor visualization. Converted to using a Jordan videoscope with significantly different results. Intubation without difficulty

## 2024-07-08 NOTE — OP NOTE
RIGHT ORIF Hip IM nail, FNS *C-ARM * SYNTHES * (R) Operative Note     Date: 2024 - 2024  OR Location: [facility]    Name: Jeanna Haynes, : 1955, Age: 68 y.o., MRN: [id number], Sex: female    Diagnosis  Pre-op Diagnosis     * Closed subcapital fracture of right femur, initial encounter (Multi) [S72.011A] Post-op Diagnosis     * Closed subcapital fracture of right femur, initial encounter (Multi) [S72.011A]     Procedures  RIGHT ORIF Hip IM nail, FNS *C-ARM * SYNTHES *  47459 - WV PRQ SKEL FIXJ FEMORAL FX PROX END NECK      Surgeons      * Joel Jackson - Primary    Resident/Fellow/Other Assistant:  Surgeons and Role:  * No surgeons found with a matching role *    Procedure Summary  Anesthesia: Anesthesia type not filed in the log.  ASA: III  Anesthesia Staff: CRNA: ALEJANDRO Varela-CRNA  Estimated Blood Loss: 30mL  Intra-op Medications:   Administrations occurring from 1050 to 1205 on 24:   Medication Name Total Dose   lactated Ringer's infusion 59.17 mL   famotidine PF (Pepcid) injection 20 mg 20 mg   magnesium sulfate IV 2 g Cannot be calculated   metoclopramide (Reglan) injection 10 mg 10 mg   vancomycin (Vancocin) 1,000 mg in dextrose 5% water 200 mL Cannot be calculated              Anesthesia Record               Intraprocedure I/O Totals          Intake    Tranexamic Acid 0.00 mL    The total shown is the total volume documented since Anesthesia Start was filed.    Total Intake 0 mL          Specimen: No specimens collected     Staff:   Circulator: Nichelle  Scrub Person: Mari  Circulator: Fabiana         Drains and/or Catheters:   NG/OG/Feeding Tube OG - Arthur sump 18 Fr Center mouth (Active)       External Urinary Catheter Female (Active)   Output (mL) 450 mL 24 1037       Tourniquet Times:         Implants:  Implants       Type Name Action Serial No.      Screw PLATE, FERMOAL NECK, 1 HOLE, STERILE - [id number] Implanted       Antirotation Screw For Femoral Neck SYS 95mm Implanted        BOLT For Femoral Neck System 95mm Implanted      Screw SCREW, LOCKING SELF TAP, 5.0 X 42, W/T25, STERILE - [id number] Implanted               Findings: Valgus impacted femoral neck fracture    Indications: Jeanna Haynes is an 68 y.o. female who is having surgery for Closed subcapital fracture of right femur, initial encounter (Multi) [S72.011A].  Preop informed consent was obtained from patient to proceed with surgery    The patient was seen in the preoperative area. The risks, benefits, complications, treatment options, non-operative alternatives, expected recovery and outcomes were discussed with the patient. The possibilities of reaction to medication, pulmonary aspiration, injury to surrounding structures, bleeding, recurrent infection, the need for additional procedures, failure to diagnose a condition, and creating a complication requiring transfusion or operation were discussed with the patient. The patient concurred with the proposed plan, giving informed consent.  The site of surgery was properly noted/marked if necessary per policy. The patient has been actively warmed in preoperative area. Preoperative antibiotics have been ordered and given within 1 hours of incision. Venous thrombosis prophylaxis have been ordered including unilateral sequential compression device    Procedure Details: Procedure Details: Patient brought to the OR and under [facility] induced under general LMA anesthetic without difficulty.  She was then transferred onto a standard Ortho fluoroscopy table.  The left lower extremity was prepped and draped in usual sterile manner with hip drape.  We used intraoperative standard fluoroscopy throughout.  We marked on the proximal lateral thigh our incisional region and then infiltrated 30 cc of half percent Marcaine with epinephrine in the area of the procedure.  We then made an approximate 4 cm longitudinal incision in the proximal lateral left thigh.  After sharp dissection of the  dermis blunt dissection brought us down to the fascia silverio, the fascia silverio was incised with a 10 blade, then a key elevator reflected some of the vastus lateralis over the proximal lateral femur.  Once again using standard C arm throughout we then placed a guidepin for a Synthes FNS through the guide pin peterson at the appropriate angle from around the level of the lesser trochanter up into the just slightly inferior femoral head region.  We then measured for an 85 solid helical screw.  We then placed the single hole FNS helical screw over the guidepin tapped into place with the sideplate on the lateral femur.  Using the guides we then placed a locking screw into the sideplate and placed an antirotation 85 mm screw up next to the helical screw.  Final standard C arm images were obtained identify stable fixation of the fracture in AP and lateral planes and final images were obtained.  We then yrn irrigated the region of surgery with saline.  We closed the fascia silverio with a figure-of-eight 0 Vicryl suture.  Subdermis was closed with buried interrupted 2-0 Vicryl suture and the skin with surgical staples.  A Mepilex dressing was applied the patient was then awoken in stable condition and transferred to recovery.   Complications:  None; patient tolerated the procedure well.    Disposition: PACU - hemodynamically stable.  Condition: stable         Additional Details: Stable internal fixation of fracture    Attending Attestation:     Joel Jackson  Phone Number: [phone]

## 2024-07-08 NOTE — CARE PLAN
Problem: Fall/Injury  Goal: Not fall by end of shift  Outcome: Progressing  Goal: Be free from injury by end of the shift  Outcome: Progressing  Goal: Verbalize understanding of personal risk factors for fall in the hospital  Outcome: Progressing  Goal: Verbalize understanding of risk factor reduction measures to prevent injury from fall in the home  Outcome: Progressing  Goal: Use assistive devices by end of the shift  Outcome: Progressing  Goal: Pace activities to prevent fatigue by end of the shift  Outcome: Progressing     Problem: Pain  Goal: Takes deep breaths with improved pain control throughout the shift  Outcome: Progressing  Goal: Turns in bed with improved pain control throughout the shift  Outcome: Progressing  Goal: Walks with improved pain control throughout the shift  Outcome: Progressing  Goal: Performs ADL's with improved pain control throughout shift  Outcome: Progressing  Goal: Participates in PT with improved pain control throughout the shift  Outcome: Progressing  Goal: Free from opioid side effects throughout the shift  Outcome: Progressing  Goal: Free from acute confusion related to pain meds throughout the shift  Outcome: Progressing     Problem: Skin  Goal: Decreased wound size/increased tissue granulation at next dressing change  Outcome: Progressing  Goal: Participates in plan/prevention/treatment measures  Outcome: Progressing  Goal: Prevent/manage excess moisture  Outcome: Progressing  Goal: Prevent/minimize sheer/friction injuries  Outcome: Progressing  Flowsheets (Taken 7/8/2024 0218)  Prevent/minimize sheer/friction injuries: Use pull sheet  Goal: Promote/optimize nutrition  Outcome: Progressing  Goal: Promote skin healing  Outcome: Progressing

## 2024-07-08 NOTE — PROGRESS NOTES
Jeanna Haynes is a 68 y.o. female on day 1 of admission presenting with Closed subcapital fracture of right femur, initial encounter (Multi).      Subjective   Jeanna Haynes is a 68 y.o. female with PMHx s/f HTN, DLD, mitral valve regurg s/p repair, hypothyroidism, prediabetes (A1c 6.4%), h/o PRES presented 7/7/24 with right hip pain.  Patient states she was walking to the car and stepped into the pothole in the parking lot, and fell onto her right hip.  She denies hitting her head, loss of consciousness.  She is currently on baby aspirin 81 mg.  She notes that she has no pain at rest, and excruciating pain with any movements.  CBC-leukocytes 15.4, neutrophils 12.68- suspect reactive. CT hip right-Subcapital neck fracture of the right hip. Sigmoid diverticulosis    7/8/2024: No acute events overnight. Vitals stable, BP accelerated 150/87. Labs largely unremarkable, leukocytosis has resolved. No anemia but hgb did downtrend 15.3-> 14.1 as expected from fracture  She has pain only with movement of R hip. She was taken down for pre-op, noted to have ventricular trigeminy on monitor, ECG confirms. Will give 2g IV mag. Discussed with monica Jade to proceed with surgical procedure per anesthesiologists discretion.          Review of Systems   Constitutional:  Negative for appetite change, chills, diaphoresis, fatigue and fever.   HENT:  Negative for congestion, ear pain, facial swelling, hearing loss, nosebleeds, sore throat, tinnitus and trouble swallowing.    Eyes:  Negative for pain.   Respiratory:  Negative for cough, chest tightness, shortness of breath and wheezing.    Cardiovascular:  Negative for chest pain, palpitations and leg swelling.   Gastrointestinal:  Negative for abdominal pain, blood in stool, constipation, diarrhea, nausea and vomiting.   Genitourinary:  Negative for dysuria, flank pain, frequency, hematuria and urgency.   Musculoskeletal:  Negative for back pain and joint swelling.        R hip pain    Skin:  Negative for rash and wound.   Neurological:  Negative for dizziness, syncope, weakness, light-headedness, numbness and headaches.   Hematological:  Does not bruise/bleed easily.   Psychiatric/Behavioral:  Negative for behavioral problems, hallucinations and suicidal ideas.           Objective     Last Recorded Vitals  /84   Pulse 71   Temp 36.8 °C (98.3 °F) (Temporal)   Resp 18   Wt 79.4 kg (175 lb)   SpO2 98%     Image Results  ECG 12 lead    Result Date: 7/8/2024  Sinus rhythm Ventricular trigeminy    CT hip right wo IV contrast    Result Date: 7/7/2024  STUDY: CT Extremity; Completed Time:  7/7/2024 5:56 PM. INDICATION: Evaluate for right hip fracture. COMPARISON: None available. ACCESSION NUMBER(S): [id number] ORDERING CLINICIAN: DOMITILA OBRIEN TECHNIQUE:  Thin section axial images were obtained through the right hip without intravenous contrast.  Orthogonal reconstructed images were obtained and reviewed.  Automated mA/kV exposure control was utilized and patient examination was performed in strict accordance with principles of ALARA. FINDINGS: There is a subcapital fracture of the right femoral neck.  The femoral head itself is not displaced with respect to the acetabulum.  No acute osseous findings are seen in the proximal right femoral shaft.  No acute findings are seen within the right iliac wing or right pubic ramus.  There is minimal edema within the surrounding musculature.  No hematoma is seen within the right hemipelvis.  There are diverticula seen in the sigmoid colon.    1.  Subcapital neck fracture of the right hip. 2.  Sigmoid diverticulosis. Signed by Kevin Booth MD    Urgent Care Xray    Result Date: 7/7/2024  Interpreted By:  Beau Hernandez, STUDY: XR URGENT CARE XRAY;  7/7/2024 2:22 pm   INDICATION: Signs/Symptoms:right hip pain.   COMPARISON: None.   ACCESSION NUMBER(S): [id number]   ORDERING CLINICIAN: FELIX HALL   TECHNIQUE: Two views of the right hip   FINDINGS:  There is increased density along the subcapital region on the lateral side of the right femoral neck. The femoral neck is otherwise not well profiled. There is marginal osteophyte formation along the right femoral head neck junction. No dislocation is evident. No radiopaque foreign body is evident.       Although the increased density at the subcapital region of the right femoral neck may represent a collar of osteophytes, subcapital fracture of the right femoral neck is not excluded on the basis of these radiographs. Knowledge of any trauma may be helpful. CT is recommended for further assessment.   MACRO: None   Signed by: Beau Hernandez 7/7/2024 2:54 PM Dictation workstation:   [id number]       Lab Results  Results for orders placed or performed during the hospital encounter of 07/07/24 (from the past 24 hour(s))   CBC and Auto Differential   Result Value Ref Range    WBC 15.4 (H) 4.4 - 11.3 x10*3/uL    nRBC 0.0 0.0 - 0.0 /100 WBCs    RBC 4.86 4.00 - 5.20 x10*6/uL    Hemoglobin 15.3 12.0 - 16.0 g/dL    Hematocrit 45.8 36.0 - 46.0 %    MCV 94 80 - 100 fL    MCH 31.5 26.0 - 34.0 pg    MCHC 33.4 32.0 - 36.0 g/dL    RDW 14.0 11.5 - 14.5 %    Platelets 291 150 - 450 x10*3/uL    Neutrophils % 82.0 40.0 - 80.0 %    Immature Granulocytes %, Automated 0.6 0.0 - 0.9 %    Lymphocytes % 9.7 13.0 - 44.0 %    Monocytes % 7.1 2.0 - 10.0 %    Eosinophils % 0.2 0.0 - 6.0 %    Basophils % 0.4 0.0 - 2.0 %    Neutrophils Absolute 12.68 (H) 1.20 - 7.70 x10*3/uL    Immature Granulocytes Absolute, Automated 0.09 0.00 - 0.70 x10*3/uL    Lymphocytes Absolute 1.49 1.20 - 4.80 x10*3/uL    Monocytes Absolute 1.09 (H) 0.10 - 1.00 x10*3/uL    Eosinophils Absolute 0.03 0.00 - 0.70 x10*3/uL    Basophils Absolute 0.06 0.00 - 0.10 x10*3/uL   Comprehensive metabolic panel   Result Value Ref Range    Glucose 111 (H) 74 - 99 mg/dL    Sodium 137 136 - 145 mmol/L    Potassium 3.9 3.5 - 5.3 mmol/L    Chloride 103 98 - 107 mmol/L    Bicarbonate 23 21  - 32 mmol/L    Anion Gap 15 10 - 20 mmol/L    Urea Nitrogen 17 6 - 23 mg/dL    Creatinine 0.81 0.50 - 1.05 mg/dL    eGFR 79 >60 mL/min/1.73m*2    Calcium 9.7 8.6 - 10.3 mg/dL    Albumin 4.3 3.4 - 5.0 g/dL    Alkaline Phosphatase 73 33 - 136 U/L    Total Protein 7.2 6.4 - 8.2 g/dL    AST 20 9 - 39 U/L    Bilirubin, Total 0.6 0.0 - 1.2 mg/dL    ALT 18 7 - 45 U/L   Magnesium   Result Value Ref Range    Magnesium 1.93 1.60 - 2.40 mg/dL   Type And Screen   Result Value Ref Range    ABO TYPE A     Rh TYPE POS     ANTIBODY SCREEN NEG    CBC and Auto Differential   Result Value Ref Range    WBC 8.8 4.4 - 11.3 x10*3/uL    nRBC 0.0 0.0 - 0.0 /100 WBCs    RBC 4.48 4.00 - 5.20 x10*6/uL    Hemoglobin 14.1 12.0 - 16.0 g/dL    Hematocrit 42.2 36.0 - 46.0 %    MCV 94 80 - 100 fL    MCH 31.5 26.0 - 34.0 pg    MCHC 33.4 32.0 - 36.0 g/dL    RDW 14.0 11.5 - 14.5 %    Platelets 252 150 - 450 x10*3/uL    Neutrophils % 76.4 40.0 - 80.0 %    Immature Granulocytes %, Automated 0.5 0.0 - 0.9 %    Lymphocytes % 14.0 13.0 - 44.0 %    Monocytes % 7.0 2.0 - 10.0 %    Eosinophils % 1.5 0.0 - 6.0 %    Basophils % 0.6 0.0 - 2.0 %    Neutrophils Absolute 6.75 1.20 - 7.70 x10*3/uL    Immature Granulocytes Absolute, Automated 0.04 0.00 - 0.70 x10*3/uL    Lymphocytes Absolute 1.24 1.20 - 4.80 x10*3/uL    Monocytes Absolute 0.62 0.10 - 1.00 x10*3/uL    Eosinophils Absolute 0.13 0.00 - 0.70 x10*3/uL    Basophils Absolute 0.05 0.00 - 0.10 x10*3/uL   Comprehensive Metabolic Panel   Result Value Ref Range    Glucose 85 74 - 99 mg/dL    Sodium 137 136 - 145 mmol/L    Potassium 3.6 3.5 - 5.3 mmol/L    Chloride 104 98 - 107 mmol/L    Bicarbonate 26 21 - 32 mmol/L    Anion Gap 11 10 - 20 mmol/L    Urea Nitrogen 10 6 - 23 mg/dL    Creatinine 0.69 0.50 - 1.05 mg/dL    eGFR >90 >60 mL/min/1.73m*2    Calcium 8.2 (L) 8.6 - 10.3 mg/dL    Albumin 3.5 3.4 - 5.0 g/dL    Alkaline Phosphatase 69 33 - 136 U/L    Total Protein 5.9 (L) 6.4 - 8.2 g/dL    AST 18 9 - 39 U/L     Bilirubin, Total 0.7 0.0 - 1.2 mg/dL    ALT 13 7 - 45 U/L   Hemoglobin A1C   Result Value Ref Range    Hemoglobin A1C 5.5 see below %    Estimated Average Glucose 111 Not Established mg/dL   ECG 12 lead   Result Value Ref Range    Ventricular Rate 58 BPM    Atrial Rate 60 BPM    PA Interval 205 ms    QRS Duration 88 ms    QT Interval 420 ms    QTC Calculation(Bazett) 413 ms    P Axis 53 degrees    R Axis 60 degrees    T Axis 81 degrees    QRS Count 10 beats    Q Onset 252 ms    T Offset 462 ms    QTC Fredericia 415 ms        Medications  Scheduled medications:  [Transfer Hold] amLODIPine, 10 mg, oral, Daily  [Held by provider] aspirin, 81 mg, oral, Daily  [Transfer Hold] docusate sodium, 100 mg, oral, BID  [Transfer Hold] heparin (porcine), 5,000 Units, subcutaneous, q8h CHONG  [Transfer Hold] levothyroxine, 125 mcg, oral, Daily  [Transfer Hold] losartan, 100 mg, oral, Daily  [Transfer Hold] metoprolol succinate XL, 25 mg, oral, Daily  [Transfer Hold] pantoprazole, 40 mg, oral, Daily before breakfast   Or  [Transfer Hold] pantoprazole, 40 mg, intravenous, Daily before breakfast  [Transfer Hold] polyethylene glycol, 17 g, oral, Daily  vancomycin, 1,000 mg, intravenous, Once      Continuous medications:  lactated Ringer's, 75 mL/hr, Last Rate: 75 mL/hr (07/08/24 0400)  lactated Ringer's, 50 mL/hr, Last Rate: 50 mL/hr (07/08/24 1054)      PRN medications:  PRN medications: [Transfer Hold] acetaminophen, [Transfer Hold] bisacodyl, [Transfer Hold] bisacodyl, [Transfer Hold] guaiFENesin, [Transfer Hold] HYDROmorphone, [Transfer Hold] melatonin, [Transfer Hold] morphine, [Transfer Hold] morphine, [Transfer Hold] ondansetron     Physical Exam  Constitutional:       General: She is not in acute distress.     Appearance: Normal appearance.   HENT:      Head: Normocephalic and atraumatic.      Right Ear: External ear normal.      Left Ear: External ear normal.      Nose: Nose normal.      Mouth/Throat:      Mouth: Mucous  membranes are moist.      Pharynx: Oropharynx is clear.   Eyes:      Extraocular Movements: Extraocular movements intact.      Conjunctiva/sclera: Conjunctivae normal.      Pupils: Pupils are equal, round, and reactive to light.   Cardiovascular:      Rate and Rhythm: Normal rate and regular rhythm.      Pulses: Normal pulses.      Heart sounds: Normal heart sounds.   Pulmonary:      Effort: Pulmonary effort is normal. No respiratory distress.      Breath sounds: Normal breath sounds. No wheezing, rhonchi or rales.   Abdominal:      General: Bowel sounds are normal.      Palpations: Abdomen is soft.      Tenderness: There is no abdominal tenderness. There is no right CVA tenderness, left CVA tenderness, guarding or rebound.   Musculoskeletal:      Cervical back: Normal range of motion and neck supple.      Comments: Did not attempt ROM R hip  Noted to be neurovascularly intact distal to fracture site   Skin:     General: Skin is warm and dry.      Capillary Refill: Capillary refill takes less than 2 seconds.      Findings: No lesion or rash.   Neurological:      General: No focal deficit present.      Mental Status: She is alert and oriented to person, place, and time. Mental status is at baseline.   Psychiatric:         Mood and Affect: Mood normal.         Behavior: Behavior normal.                  Code Status  Full Code     Assessment/Plan      Closed subcapital fracture of right femur  -RCRI 0  -CT hip right-Subcapital neck fracture of the right hip  -orthopedics recommendations appreciated  -OR for right hip ORIF tomorrow  -NPO at midnight  -pain meds PRN     Ventricular trigeminy  -Incidental finding, not symptomatic  -ECG confirms  -Monitor on tele  -Check magnesium level  -Supplement magnesium  -Discussed with Dr. Masters    HTN  -Continue home medications   -Monitor and adjust as needed while admitted     Hypothyroidism  -Continue home medications     Hx MV repair  -follows outpatient cardiology  -no  anginal symptoms currently  -Echo on 12/11/2023 shows LVEF normal    Hx PRES syndrome  -diagnosed in 05/22, no overt etiology for PRES was found by history and believed to be caused by her HTN  -continues home meds    DVT ppx: subcutaneous heparin for now, per ortho post-operatively       Please see orders for more complete plan    Laura Wilde PA-C

## 2024-07-08 NOTE — H&P
[facility] - GENERAL MEDICINE HISTORY AND PHYSICAL    History Obtained From: Patient    History Of Present Illness:  Jeanna Haynes is a 68 y.o. female with PMHx s/f HTN, DLD, mitral valve regurg s/p repair, hypothyroidism, prediabetes (A1c 6.4%), h/o PRES presenting with right hip pain.  Patient states she was walking to the car and stepped into the pothole in the parking lot, and fell onto her right hip.  She denies hitting her head, loss of consciousness.  She is currently on baby aspirin 81 mg.  She notes that she has no pain at rest, and excruciating pain with any movements.  She denies fever chills, nausea vomiting, chest pain, shortness of breath, headache, dizziness, changes in vision, syncope.    ED Course (Summary):   Vitals on presentation: 98.0 F, 72 bpm, 20 RR, 160/92, 98% on RA  Labs: Chemistries-within normal limits  CBC-leukocytes 15.4, neutrophils 12.68  Imaging: CT hip right-Subcapital neck fracture of the right hip. Sigmoid diverticulosis   Interventions: Morphine IV 4 mg x 2, admission for surgery tomorrow    ED Course (From Provider):  ED Course as of 07/07/24 2256   Sun Jul 07, 2024 1912 CT confirmed a right subcapital neck fracture.  Discussed with orthopedics Dr. Kovacs who agrees with admission to medicine and recommended patient be kept n.p.o. at midnight.  [facility] paged. []   1942 Spoke with IMS team who agrees with admission to medicine floor.  [DR]      ED Course User Index  [] Renee Vivar, DO         Diagnoses as of 07/07/24 2256   Closed subcapital fracture of right femur, initial encounter (Multi)     Relevant Results  Results for orders placed or performed during the hospital encounter of 07/07/24 (from the past 24 hour(s))   CBC and Auto Differential   Result Value Ref Range    WBC 15.4 (H) 4.4 - 11.3 x10*3/uL    nRBC 0.0 0.0 - 0.0 /100 WBCs    RBC 4.86 4.00 - 5.20 x10*6/uL    Hemoglobin 15.3 12.0 - 16.0 g/dL    Hematocrit 45.8 36.0 - 46.0 %    MCV 94 80 - 100 fL     MCH 31.5 26.0 - 34.0 pg    MCHC 33.4 32.0 - 36.0 g/dL    RDW 14.0 11.5 - 14.5 %    Platelets 291 150 - 450 x10*3/uL    Neutrophils % 82.0 40.0 - 80.0 %    Immature Granulocytes %, Automated 0.6 0.0 - 0.9 %    Lymphocytes % 9.7 13.0 - 44.0 %    Monocytes % 7.1 2.0 - 10.0 %    Eosinophils % 0.2 0.0 - 6.0 %    Basophils % 0.4 0.0 - 2.0 %    Neutrophils Absolute 12.68 (H) 1.20 - 7.70 x10*3/uL    Immature Granulocytes Absolute, Automated 0.09 0.00 - 0.70 x10*3/uL    Lymphocytes Absolute 1.49 1.20 - 4.80 x10*3/uL    Monocytes Absolute 1.09 (H) 0.10 - 1.00 x10*3/uL    Eosinophils Absolute 0.03 0.00 - 0.70 x10*3/uL    Basophils Absolute 0.06 0.00 - 0.10 x10*3/uL   Comprehensive metabolic panel   Result Value Ref Range    Glucose 111 (H) 74 - 99 mg/dL    Sodium 137 136 - 145 mmol/L    Potassium 3.9 3.5 - 5.3 mmol/L    Chloride 103 98 - 107 mmol/L    Bicarbonate 23 21 - 32 mmol/L    Anion Gap 15 10 - 20 mmol/L    Urea Nitrogen 17 6 - 23 mg/dL    Creatinine 0.81 0.50 - 1.05 mg/dL    eGFR 79 >60 mL/min/1.73m*2    Calcium 9.7 8.6 - 10.3 mg/dL    Albumin 4.3 3.4 - 5.0 g/dL    Alkaline Phosphatase 73 33 - 136 U/L    Total Protein 7.2 6.4 - 8.2 g/dL    AST 20 9 - 39 U/L    Bilirubin, Total 0.6 0.0 - 1.2 mg/dL    ALT 18 7 - 45 U/L   Magnesium   Result Value Ref Range    Magnesium 1.93 1.60 - 2.40 mg/dL   Type And Screen   Result Value Ref Range    ABO TYPE A     Rh TYPE POS     ANTIBODY SCREEN NEG       CT hip right wo IV contrast    Result Date: 7/7/2024  STUDY: CT Extremity; Completed Time:  7/7/2024 5:56 PM. INDICATION: Evaluate for right hip fracture. COMPARISON: None available. ACCESSION NUMBER(S): [id number] ORDERING CLINICIAN: DOMITILA OBRIEN TECHNIQUE:  Thin section axial images were obtained through the right hip without intravenous contrast.  Orthogonal reconstructed images were obtained and reviewed.  Automated mA/kV exposure control was utilized and patient examination was performed in strict accordance with principles  nithin CAN. FINDINGS: There is a subcapital fracture of the right femoral neck.  The femoral head itself is not displaced with respect to the acetabulum.  No acute osseous findings are seen in the proximal right femoral shaft.  No acute findings are seen within the right iliac wing or right pubic ramus.  There is minimal edema within the surrounding musculature.  No hematoma is seen within the right hemipelvis.  There are diverticula seen in the sigmoid colon.    1.  Subcapital neck fracture of the right hip. 2.  Sigmoid diverticulosis. Signed by Kevin Booth MD    Urgent Care Xray    Result Date: 7/7/2024  Interpreted By:  Beau Hernandez, STUDY: XR URGENT CARE XRAY;  7/7/2024 2:22 pm   INDICATION: Signs/Symptoms:right hip pain.   COMPARISON: None.   ACCESSION NUMBER(S): [id number]   ORDERING CLINICIAN: FELIX HALL   TECHNIQUE: Two views of the right hip   FINDINGS: There is increased density along the subcapital region on the lateral side of the right femoral neck. The femoral neck is otherwise not well profiled. There is marginal osteophyte formation along the right femoral head neck junction. No dislocation is evident. No radiopaque foreign body is evident.       Although the increased density at the subcapital region of the right femoral neck may represent a collar of osteophytes, subcapital fracture of the right femoral neck is not excluded on the basis of these radiographs. Knowledge of any trauma may be helpful. CT is recommended for further assessment.   MACRO: None   Signed by: Beau Hernandez 7/7/2024 2:54 PM Dictation workstation:   [id number]    Scheduled medications:  amLODIPine, 10 mg, oral, Daily  [Held by provider] aspirin, 81 mg, oral, Daily  [START ON 7/8/2024] docusate sodium, 100 mg, oral, BID  heparin (porcine), 5,000 Units, subcutaneous, q8h CHONG  levothyroxine, 125 mcg, oral, Daily  losartan, 100 mg, oral, Daily  metoprolol succinate XL, 25 mg, oral, Daily  [START ON 7/8/2024] pantoprazole,  40 mg, oral, Daily before breakfast   Or  [START ON 7/8/2024] pantoprazole, 40 mg, intravenous, Daily before breakfast  polyethylene glycol, 17 g, oral, Daily      Continuous medications:  lactated Ringer's, 75 mL/hr, Last Rate: 75 mL/hr (07/07/24 2134)      PRN medications:  PRN medications: acetaminophen, bisacodyl, bisacodyl, guaiFENesin, HYDROmorphone, melatonin, morphine, morphine, ondansetron     Past Medical History  She has a past medical history of Acute bronchospasm (09/04/2014), Anxiety disorder, unspecified (12/08/2013), Asymptomatic menopausal state (12/08/2013), Constipation, unspecified (12/08/2013), Cough, unspecified (09/01/2015), Depression, unspecified (12/08/2013), Dermatitis, unspecified (12/08/2013), Impacted cerumen, left ear (05/07/2017), Other abnormalities of breathing (12/08/2013), Pain in leg, unspecified (12/08/2013), Palpitations (12/08/2013), Primary osteoarthritis, unspecified ankle and foot (12/08/2013), and Urticaria, unspecified (12/08/2013).    Surgical History  She has a past surgical history that includes Other surgical history (07/15/2022); Other surgical history (10/24/2019); MR angio head wo IV contrast (5/25/2022); and MR angio neck wo IV contrast (5/25/2022).     Social History  She reports that she quit smoking about 46 years ago. Her smoking use included cigarettes. She has never used smokeless tobacco. No history on file for alcohol use and drug use.    Family History  Family History   Problem Relation Name Age of Onset    Colon cancer Mother      Hypertension Father      Testicular cancer Father      Colon cancer Brother      Schizophrenia Son         Allergies  Penicillins and Latex    Code Status  Full Code     Review of Systems   Constitutional:  Negative for chills, fatigue and fever.   HENT:  Negative for congestion.    Respiratory:  Negative for cough, chest tightness, shortness of breath and wheezing.    Cardiovascular:  Negative for chest pain, palpitations and  leg swelling.   Gastrointestinal:  Negative for abdominal pain, constipation, diarrhea, nausea and vomiting.   Genitourinary:  Negative for flank pain and hematuria.   Musculoskeletal:  Negative for back pain and joint swelling.   Skin:  Negative for rash and wound.   Neurological:  Negative for tremors, syncope, weakness and headaches.       Last Recorded Vitals  /74 (BP Location: Right arm, Patient Position: Lying)   Pulse 64   Temp 36.1 °C (97 °F) (Temporal)   Resp 18   Wt 79.4 kg (175 lb)   SpO2 94%      Physical Exam:  Vital signs and nursing notes reviewed.   Constitutional: Pleasant and cooperative. Laying in bed in no acute distress. Conversant.   Skin: Warm and dry; no obvious lesions, rashes, pallor, or jaundice. Good turgor.   Eyes: EOMI. Anicteric sclera.   ENT: Mucous membranes moist; no obvious injury or deformity appreciated.   Head and Neck: Normocephalic, atraumatic. ROM preserved. Trachea midline. No appreciable JVD.   Respiratory: Nonlabored on RA. Lungs clear to auscultation bilaterally without obvious adventitious sounds. Chest rise is equal.  Cardiovascular: RRR. No gross murmur, gallop, or rub. Extremities are warm and well-perfused with good capillary refill (< 3 seconds). No chest wall tenderness.   GI: Abdomen soft, nontender, nondistended. No obvious organomegaly appreciated. Bowel sounds are present and normoactive.  : No CVA tenderness.   MSK: No gross abnormalities appreciated. Limited right hip movement secondary to pain. Full AROM of bilateral ankles. Neurovascular intact distally.  Extremities: No cyanosis, edema, or clubbing evident. Neurovascularly intact.   Neuro: A&Ox3. CN 2-12 grossly intact. Able to respond to questions appropriately and clearly. No acute focal neurologic deficits appreciated.  Psych: Appropriate mood and behavior.    Assessment/Plan   Principal Problem:    Closed subcapital fracture of right femur, initial encounter (Multi)  Active Problems:     Hypertension    Mitral regurgitation    Mixed hyperlipidemia    Hypothyroidism    Overweight with body mass index (BMI) 25.0-29.9    Revised Cardiac Risk Index (RCRI):  1. Elevated-risk surgery (Intraperitoneal; intrathoracic; suprainguinal vascular): No +0  2. H/o ischemic heart disease (H/o MI; hx positive exercise test; current CP d/t MI; use of nitrates or ECG with pathological Q waves): No +0  3. H/o congestive heart failure (Pulmonary edema, bilateral rales or S3 gallop; PND; CXR with pulmonary vascular redistribution): No +0  4. History of cerebrovascular disease (Prior TIA or stroke): No +0  5. Pre-operative treatment with insulin: No +0  6. Pre-operative creatinine >2 mg/dL / 176.8 µmol/L: No +0  TOTAL = 0  Patient will be at low risk for surgery  Formal risk/benefit discussion with the surgeon is recommended    Close subcapital fracture of right femur  -CT hip right-Subcapital neck fracture of the right hip  -orthopedics recommendations appreciated  -OR for right hip ORIF tomorrow  -NPO at midnight  -pain meds PRN     HTN/hypothyroidism  -Continue home medications   -Monitor and adjust as needed while admitted     Hx of mitral valve repair  -follows outpatient cardiology  -no anginal symptoms currently  -Echo on 12/11/2023 shows LVEF normal    Hx of PRES syndrome  -diagnosed in 05/22, no overt etiology for PRES was found by history and believed to be caused by her HTN  -continues home meds    Diet: N.p.o. at midnight  DVT Prophylaxis: Heparin  Code Status: Full code     Hu Lawrence PA-C    Dragon dictation software was used to dictate this note and thus there may be minor errors in translation/transcription including garbled speech or misspellings. Please contact for clarification if needed.

## 2024-07-09 LAB
ANION GAP SERPL CALC-SCNC: 11 MMOL/L (ref 10–20)
BUN SERPL-MCNC: 12 MG/DL (ref 6–23)
CALCIUM SERPL-MCNC: 8 MG/DL (ref 8.6–10.3)
CHLORIDE SERPL-SCNC: 103 MMOL/L (ref 98–107)
CO2 SERPL-SCNC: 25 MMOL/L (ref 21–32)
CREAT SERPL-MCNC: 0.82 MG/DL (ref 0.5–1.05)
EGFRCR SERPLBLD CKD-EPI 2021: 78 ML/MIN/1.73M*2
ERYTHROCYTE [DISTWIDTH] IN BLOOD BY AUTOMATED COUNT: 14 % (ref 11.5–14.5)
GLUCOSE SERPL-MCNC: 104 MG/DL (ref 74–99)
HCT VFR BLD AUTO: 38.2 % (ref 36–46)
HGB BLD-MCNC: 12.9 G/DL (ref 12–16)
MAGNESIUM SERPL-MCNC: 2.21 MG/DL (ref 1.6–2.4)
MCH RBC QN AUTO: 31.9 PG (ref 26–34)
MCHC RBC AUTO-ENTMCNC: 33.8 G/DL (ref 32–36)
MCV RBC AUTO: 95 FL (ref 80–100)
NRBC BLD-RTO: 0 /100 WBCS (ref 0–0)
PLATELET # BLD AUTO: 266 X10*3/UL (ref 150–450)
POTASSIUM SERPL-SCNC: 4.3 MMOL/L (ref 3.5–5.3)
RBC # BLD AUTO: 4.04 X10*6/UL (ref 4–5.2)
SODIUM SERPL-SCNC: 135 MMOL/L (ref 136–145)
WBC # BLD AUTO: 12.7 X10*3/UL (ref 4.4–11.3)

## 2024-07-09 PROCEDURE — 97116 GAIT TRAINING THERAPY: CPT | Mod: CQ,GP

## 2024-07-09 PROCEDURE — 97165 OT EVAL LOW COMPLEX 30 MIN: CPT | Mod: GO

## 2024-07-09 PROCEDURE — 2500000001 HC RX 250 WO HCPCS SELF ADMINISTERED DRUGS (ALT 637 FOR MEDICARE OP): Performed by: SPECIALIST

## 2024-07-09 PROCEDURE — 2500000004 HC RX 250 GENERAL PHARMACY W/ HCPCS (ALT 636 FOR OP/ED): Performed by: SPECIALIST

## 2024-07-09 PROCEDURE — 1210000001 HC SEMI-PRIVATE ROOM DAILY

## 2024-07-09 PROCEDURE — 97110 THERAPEUTIC EXERCISES: CPT | Mod: GP

## 2024-07-09 PROCEDURE — 99233 SBSQ HOSP IP/OBS HIGH 50: CPT | Performed by: PHYSICIAN ASSISTANT

## 2024-07-09 PROCEDURE — 80048 BASIC METABOLIC PNL TOTAL CA: CPT | Performed by: SPECIALIST

## 2024-07-09 PROCEDURE — 85027 COMPLETE CBC AUTOMATED: CPT | Performed by: SPECIALIST

## 2024-07-09 PROCEDURE — 97162 PT EVAL MOD COMPLEX 30 MIN: CPT | Mod: GP

## 2024-07-09 PROCEDURE — 83735 ASSAY OF MAGNESIUM: CPT | Performed by: SPECIALIST

## 2024-07-09 PROCEDURE — 36415 COLL VENOUS BLD VENIPUNCTURE: CPT | Performed by: SPECIALIST

## 2024-07-09 RX ORDER — MORPHINE SULFATE 2 MG/ML
2 INJECTION, SOLUTION INTRAMUSCULAR; INTRAVENOUS EVERY 4 HOURS PRN
Status: DISCONTINUED | OUTPATIENT
Start: 2024-07-09 | End: 2024-07-10 | Stop reason: HOSPADM

## 2024-07-09 RX ORDER — OXYCODONE AND ACETAMINOPHEN 5; 325 MG/1; MG/1
1 TABLET ORAL EVERY 4 HOURS PRN
Status: DISCONTINUED | OUTPATIENT
Start: 2024-07-09 | End: 2024-07-10 | Stop reason: HOSPADM

## 2024-07-09 RX ORDER — OXYCODONE HYDROCHLORIDE 10 MG/1
10 TABLET ORAL EVERY 4 HOURS PRN
Status: DISCONTINUED | OUTPATIENT
Start: 2024-07-09 | End: 2024-07-10 | Stop reason: HOSPADM

## 2024-07-09 RX ADMIN — DOCUSATE SODIUM 100 MG: 100 CAPSULE, LIQUID FILLED ORAL at 19:42

## 2024-07-09 RX ADMIN — AMLODIPINE BESYLATE 10 MG: 10 TABLET ORAL at 08:17

## 2024-07-09 RX ADMIN — SODIUM CHLORIDE 100 ML/HR: 9 INJECTION, SOLUTION INTRAVENOUS at 12:41

## 2024-07-09 RX ADMIN — MORPHINE SULFATE 4 MG: 4 INJECTION INTRAVENOUS at 08:16

## 2024-07-09 RX ADMIN — DOCUSATE SODIUM 100 MG: 100 CAPSULE, LIQUID FILLED ORAL at 08:18

## 2024-07-09 RX ADMIN — Medication 3 MG: at 19:43

## 2024-07-09 RX ADMIN — SODIUM CHLORIDE 100 ML/HR: 9 INJECTION, SOLUTION INTRAVENOUS at 05:00

## 2024-07-09 RX ADMIN — PANTOPRAZOLE SODIUM 40 MG: 40 TABLET, DELAYED RELEASE ORAL at 05:01

## 2024-07-09 RX ADMIN — LOSARTAN POTASSIUM 100 MG: 50 TABLET, FILM COATED ORAL at 08:17

## 2024-07-09 RX ADMIN — ASPIRIN 325 MG: 325 TABLET, COATED ORAL at 08:17

## 2024-07-09 RX ADMIN — LEVOTHYROXINE SODIUM 125 MCG: 125 TABLET ORAL at 05:00

## 2024-07-09 RX ADMIN — MORPHINE SULFATE 4 MG: 4 INJECTION INTRAVENOUS at 12:45

## 2024-07-09 RX ADMIN — METOPROLOL SUCCINATE 25 MG: 25 TABLET, EXTENDED RELEASE ORAL at 19:43

## 2024-07-09 RX ADMIN — ASPIRIN 325 MG: 325 TABLET, COATED ORAL at 19:42

## 2024-07-09 RX ADMIN — HEPARIN SODIUM 5000 UNITS: 5000 INJECTION INTRAVENOUS; SUBCUTANEOUS at 05:00

## 2024-07-09 RX ADMIN — MORPHINE SULFATE 4 MG: 4 INJECTION INTRAVENOUS at 17:00

## 2024-07-09 ASSESSMENT — ENCOUNTER SYMPTOMS
FACIAL SWELLING: 0
APPETITE CHANGE: 0
EYE PAIN: 0
JOINT SWELLING: 0
HEMATURIA: 0
BACK PAIN: 0
BRUISES/BLEEDS EASILY: 0
CONSTIPATION: 0
HEADACHES: 0
SHORTNESS OF BREATH: 0
PALPITATIONS: 0
NAUSEA: 0
DYSURIA: 0
DIAPHORESIS: 0
SORE THROAT: 0
LIGHT-HEADEDNESS: 0
COUGH: 0
CHEST TIGHTNESS: 0
FREQUENCY: 0
FEVER: 0
WEAKNESS: 0
BLOOD IN STOOL: 0
VOMITING: 0
FLANK PAIN: 0
DIARRHEA: 0
NUMBNESS: 0
FATIGUE: 0
WOUND: 0
TROUBLE SWALLOWING: 0
ABDOMINAL PAIN: 0
CHILLS: 0
WHEEZING: 0
DIZZINESS: 0
HALLUCINATIONS: 0

## 2024-07-09 ASSESSMENT — PAIN DESCRIPTION - ORIENTATION
ORIENTATION: RIGHT
ORIENTATION: RIGHT

## 2024-07-09 ASSESSMENT — COGNITIVE AND FUNCTIONAL STATUS - GENERAL
MOBILITY SCORE: 15
DAILY ACTIVITIY SCORE: 20
CLIMB 3 TO 5 STEPS WITH RAILING: A LOT
DAILY ACTIVITIY SCORE: 15
TURNING FROM BACK TO SIDE WHILE IN FLAT BAD: A LITTLE
STANDING UP FROM CHAIR USING ARMS: A LITTLE
HELP NEEDED FOR BATHING: A LITTLE
MOBILITY SCORE: 14
CLIMB 3 TO 5 STEPS WITH RAILING: TOTAL
DAILY ACTIVITIY SCORE: 24
MOVING TO AND FROM BED TO CHAIR: A LOT
STANDING UP FROM CHAIR USING ARMS: A LITTLE
TOILETING: A LITTLE
MOVING FROM LYING ON BACK TO SITTING ON SIDE OF FLAT BED WITH BEDRAILS: A LITTLE
CLIMB 3 TO 5 STEPS WITH RAILING: A LOT
PERSONAL GROOMING: A LITTLE
WALKING IN HOSPITAL ROOM: A LOT
MOBILITY SCORE: 24
STANDING UP FROM CHAIR USING ARMS: A LOT
MOVING FROM LYING ON BACK TO SITTING ON SIDE OF FLAT BED WITH BEDRAILS: A LITTLE
WALKING IN HOSPITAL ROOM: A LOT
DRESSING REGULAR LOWER BODY CLOTHING: A LOT
TURNING FROM BACK TO SIDE WHILE IN FLAT BAD: A LITTLE
WALKING IN HOSPITAL ROOM: A LOT
DRESSING REGULAR LOWER BODY CLOTHING: A LOT
DRESSING REGULAR UPPER BODY CLOTHING: A LITTLE
EATING MEALS: A LITTLE
TOILETING: A LOT
HELP NEEDED FOR BATHING: A LOT
MOVING TO AND FROM BED TO CHAIR: A LITTLE
MOBILITY SCORE: 16
MOVING FROM LYING ON BACK TO SITTING ON SIDE OF FLAT BED WITH BEDRAILS: A LITTLE
TURNING FROM BACK TO SIDE WHILE IN FLAT BAD: A LITTLE
MOVING TO AND FROM BED TO CHAIR: A LITTLE

## 2024-07-09 ASSESSMENT — PAIN SCALES - GENERAL
PAINLEVEL_OUTOF10: 10 - WORST POSSIBLE PAIN
PAINLEVEL_OUTOF10: 3
PAINLEVEL_OUTOF10: 10 - WORST POSSIBLE PAIN
PAINLEVEL_OUTOF10: 3
PAINLEVEL_OUTOF10: 10 - WORST POSSIBLE PAIN
PAINLEVEL_OUTOF10: 10 - WORST POSSIBLE PAIN
PAINLEVEL_OUTOF10: 0 - NO PAIN
PAINLEVEL_OUTOF10: 4
PAINLEVEL_OUTOF10: 10 - WORST POSSIBLE PAIN

## 2024-07-09 ASSESSMENT — PAIN SCALES - WONG BAKER: WONGBAKER_NUMERICALRESPONSE: HURTS WORST

## 2024-07-09 ASSESSMENT — PAIN DESCRIPTION - LOCATION
LOCATION: HIP

## 2024-07-09 ASSESSMENT — PAIN - FUNCTIONAL ASSESSMENT
PAIN_FUNCTIONAL_ASSESSMENT: 0-10

## 2024-07-09 ASSESSMENT — ACTIVITIES OF DAILY LIVING (ADL)
ADL_ASSISTANCE: INDEPENDENT
BATHING_ASSISTANCE: MODERATE

## 2024-07-09 NOTE — PROGRESS NOTES
Occupational Therapy  Evaluation    Patient Name: Jeanna Haynes  MRN: [id number]  Today's Date: 7/9/2024  Time Calculation  Start Time: 1038  Stop Time: 1106  Time Calculation (min): 28 min    Current Problem:   1. Closed subcapital fracture of right femur, initial encounter (Multi)        OT order: OT eval and treat   Referred by: Manuel  Reason for referral: recent surgery. fall with R hip fracture. s/p ORIF.  Past medical history related to rehab: left ankle/foot surgery,  has a past medical history of Acute bronchospasm (09/04/2014), Anxiety disorder, unspecified (12/08/2013), Asymptomatic menopausal state (12/08/2013), Constipation, unspecified (12/08/2013), Cough, unspecified (09/01/2015), Depression, unspecified (12/08/2013), Dermatitis, unspecified (12/08/2013), Impacted cerumen, left ear (05/07/2017), Other abnormalities of breathing (12/08/2013), Pain in leg, unspecified (12/08/2013), Palpitations (12/08/2013), Primary osteoarthritis, unspecified ankle and foot (12/08/2013), and Urticaria, unspecified (12/08/2013).     Precautions:   LE Weight Bearing Status: Weight Bearing as Tolerated  Medical Precautions: Fall precautions    ASSESSMENT  OT Assessment: OT eval completed. The patient is functioning below baseline for ADLs and mobility. can benefit from continued OT. Pt with Decreased ADL status, Decreased upper extremity strength, Decreased safe judgment during ADL, Decreased endurance, Decreased gross motor control, Decreased IADLs  Prognosis:    Barriers to discharge:    Tolerance:      PLAN  Frequency: 3 times per week  Treatment Interventions: ADL retraining, Patient/family training  Discharge Recommendations: Low intensity level of continued care  OT OK to discharge: Yes    GENERAL VISIT INFORMATION   Start of session communication: Bedside nurse  End of session communication: Bedside nurse  Family/caregiver present: No  Caregiver feedback:    Co-Treatment: PT  Reason for co-treatment: to optimize  safety and mobility, while focusing on discipline specific goals   Position Pt Received:  Bed, 3 rail up, Alarm on  End of session position: Up in chair, Alarm on    SUBJECTIVE  Home Living:  Type of Home: House  Lives With: Spouse  Home Adaptive Equipment: Walker rolling or standard, Cane  Home Layout: Two level, 1/2 bath on main level, Bed/bath upstairs  Home Access: Stairs to enter with rails  Entrance Stairs-Number of Steps: 1  Bathroom Shower/Tub: Walk-in shower  Bathroom Equipment: Shower chair with back, Grab bars in shower  Home Living Comments: 3+7 steps to second floor     Prior Level of Function:  Receives Help From: Family  ADL Assistance: Independent  Homemaking Assistance: Independent  Ambulatory Assistance: Independent  Vocational: Retired  Leisure: being with grandkids  Prior Function Comments: +drives    IADL History:       Pain:  Assessment: 0-10  Score: 10 - Worst possible pain  Type: Acute pain, Surgical pain  Location: Hip (incision site)  Interventions: Repositioned, Cold applied  Response to pain interventions:      OBJECTIVE  Vital Signs:       Cognition:  Overall Cognitive Status: Within Functional Limits  Orientation Level: Disoriented X4  Impulsive: Moderately             Current ADL function:   EATING:  Stand by (anticipate)     GROOMING: Stand by (anticipate)     BATHING: Moderate (anticipate)     UB DRESSING: Minimal (anticipate)     LB DRESSING: Maximal Thread RLE into underwear, Thread LLE into underwear, Pull up over hips (VC for sequencing and safety)   TOILETING: Maximal (anticipate)    ADL comments:       Activity Tolerance:  Endurance: Endurance does not limit participation in activity    Bed Mobility/Transfers:   Bed Mobility  Bed Mobility: Yes  Bed Mobility 1  Bed Mobility 1: Supine to sitting  Level of Assistance 1: Minimum assistance (x1)  Transfers  Transfer:  (sit<>stand min A x1)    Ambulation/Gait Training:  Functional Mobility  Functional Mobility Performed:  (pt  peformed functional mobility around the room with min A x1+1 FWW. VC for sequencing and safety. bedside around bed to chair.)    Sitting Balance:  Static Sitting Balance  Static Sitting-Level of Assistance: Close supervision  Dynamic Sitting Balance  Dynamic Sitting-Comments: supv    Standing Balance:  Static Standing Balance  Static Standing-Comment/Number of Minutes: CGA to min A  Dynamic Standing Balance  Dynamic Standing-Comments: min A    Vision: Vision - Basic Assessment  Current Vision: No visual deficits   and      Sensation:  Light Touch: No apparent deficits    Strength:  Strength Comments: BUE WFL    Perception:  Inattention/Neglect: Appears intact    Coordination:  Movements are Fluid and Coordinated: Yes     Hand Function:  Hand Function  Gross Grasp: Functional    Extremities: RUE   RUE : Within Functional Limits and LUE   LUE: Within Functional Limits    Outcome Measures: [facility] Daily Activity  Putting on and taking off regular lower body clothing: A lot  Bathing (including washing, rinsing, drying): A lot  Putting on and taking off regular upper body clothing: A little  Toileting, which includes using toilet, bedpan or urinal: A lot  Taking care of personal grooming such as brushing teeth: A little  Eating Meals: A little  Daily Activity - Total Score: 15                    EDUCATION:     Education Documentation  ADL Training, taught by Ember Pringle OT at 7/9/2024  1:38 PM.  Learner: Patient  Readiness: Acceptance  Method: Explanation  Response: Needs Reinforcement    Education Comments  No comments found.        Goals:   Encounter Problems       Encounter Problems (Active)       ADLs       Patient with complete lower body dressing with modified independent level of assistance donning and doffing all LE clothes  with PRN adaptive equipment while supported sitting and standing (Progressing)       Start:  07/09/24    Expected End:  07/23/24            Patient will complete toileting including hygiene  clothing management/hygiene with modified independent level of assistance and raised toilet seat and grab bars. (Progressing)       Start:  07/09/24    Expected End:  07/23/24               TRANSFERS       Patient will complete functional transfers and functional mobility with least restrictive device with modified independent level of assistance. (Progressing)       Start:  07/09/24    Expected End:  07/23/24

## 2024-07-09 NOTE — CARE PLAN
Problem: Mobility  Goal: STG - Patient will ambulate  Description:  FT, WBAT RLE, CGA USING PROPER GAIT PATTERN  Outcome: Not Progressing  Goal: STG - Patient will ascend and descend 6-8stairs  Description: RAIL MIN A CANE AND CURB STEP W/ FWW, WBAT RLE  Outcome: Not Progressing  Goal: Goal 1  Description: 20 REPS AROM RLE RROM LLE INCREASING STRENGTH FOR STABLE GAIT  Outcome: Not Progressing     Problem: PT Transfers  Goal: STG - Patient to transfer to and from sit to supine  Description: SBA HOB FLAT NO RAILS  Outcome: Not Progressing  Goal: STG - Patient will transfer sit to and from stand  Description: FWW WBAT RLE USING PROPER TECHNIQUE CGA  Outcome: Not Progressing

## 2024-07-09 NOTE — PROGRESS NOTES
Physical Therapy    Physical Therapy Evaluation    Patient Name: Jeanna Haynes  MRN: [id number]  Today's Date: 7/9/2024   Time Calculation  Start Time: 1039  Stop Time: 1109  Time Calculation (min): 30 min    Assessment/Plan   PT Assessment  PT Assessment Results: Decreased strength, Decreased endurance, Impaired balance, Decreased mobility, Decreased safety awareness, Pain  Rehab Prognosis: Fair  Evaluation/Treatment Tolerance: Patient limited by fatigue, Patient limited by pain  End of Session Communication: Bedside nurse, PCT/NA/CTA (FWW WBAT RLE 2 A, VC FOR SAFETY)  Assessment Comment: FWW WBAT R LE FWW 2 A VC FOR SAFETY NEEDS LOW REHAB ON DISCH  End of Session Patient Position: Up in chair, Alarm on (CALLLIGHTIN REACH)  IP OR SWING BED PT PLAN  Inpatient or Swing Bed: Inpatient  PT Plan  Treatment/Interventions: Bed mobility, Transfer training, Gait training, Stair training, Strengthening, Endurance training  PT Plan: Ongoing PT  PT Frequency: 5 times per week (1-2X/W)  PT Discharge Recommendations: Low intensity level of continued care  Equipment Recommended upon Discharge:  (TBD)  PT Recommended Transfer Status: Assist x1, Assist x2 (FWW)  PT - OK to Discharge: Yes (WHEN MEDICALLY CLEARED)      Subjective   General Visit Information:  General  Reason for Referral: RECENT SURG, WBAT  Referred By: NIKHIL  Past Medical History Relevant to Rehab: FELLINJURING R HIP; DX: R HIP FX, ORIF W/ IMN R HIP;HX: ANXIETY DEPRESSION, PALPATIONS OA, CARDIAC SURG, HYPOTHYROID, DEVERTICULAR DZ  Co-Treatment: OT  Co-Treatment Reason: FACILITATE  MOBILITY SAFETY  Patient Position Received: Bed, 3 rail up, Alarm on (ROOM 3302, ALERT IV PURE WICK IN PLACE-REM TORIE FOR THERAPY, DRESSING ON R HIP, AGREES TO THERAPY)  General Comment: MOD EDEMA R HIP/THIGH  Home Living:  Home Living  Home Living Comments: SPOUSE, 2 LEVEL HOME BATH ON BOTH FLOOR,  3 STES/ LANDING 7 STEPS TO GET TO 2ND FLOOR, 1 STEP TO ENTER HOME, INC DEP AMB ADN ADL, WALK  IN SHOWER/BARS, SEAT, HAS FWW IN HOME, DOES CHORES AND DRIVES  Prior Level of Function:     Precautions:  Precautions  LE Weight Bearing Status:  (WBAT RLE)  Medical Precautions: Fall precautions  Post-Surgical Precautions:  (R HIP ORIF PRECAUTIONS)  Vital Signs:       Objective   Pain:  Pain Assessment  0-10 (Numeric) Pain Score: 10 - Worst possible pain  Pain Type: Surgical pain (R HIP)  Cognition:  Cognition  Orientation Level: Disoriented to situation  Attention: Exceptions to WFL  Sustained Attention:  (ANXIOUS TO MOBILIZE, MOVES QUICKLY  - DOES NOT CONSISTENTLY ATTEND TO INSTRUCTIONS INITIALLY, [facility] WAS SO BUSY PULLINGOUT PURE WICK MOVEING COVERS ETC, STAES EHI S TRYING TO BE HELPFUL  CAUTIONED HER THAT MUST MOVE CAREFULLY AND MINDFULLY DURING RECOVERY)  Safety/Judgement: Exceptions to WFL  Complex Functional Tasks: Moderate  Novel Situations: Moderate  Routine Tasks: Minimal  Insight: Mild  Impulsive: Moderately    General Assessments:  General Observation  General Observation: 10 REPS EX IN SPINE ACTIVE APRIL AP, QS/GS, ACATIVE LLE ADN MOD A RLE FOR HIP ABDUCTION AND HS               Activity Tolerance  Endurance: Endurance does not limit participation in activity    Sensation  Sensation Comment: NO C/O BUT HAD NERVE BLOCK RLE    Strength  Strength Comments: LLE WFL, RHIP 2/5 R KNEE 3/5 R ANKLE 3+/5, ROM WFL THROUGHOUT  Strength  Strength Comments: LLE WFL, RHIP 2/5 R KNEE 3/5 R ANKLE 3+/5, ROM WFL THROUGHOUT                     Static Sitting Balance  Static Sitting-Comment/Number of Minutes: FAIR  Dynamic Sitting Balance  Dynamic Sitting-Comments: FAIR/FAIR-    Static Standing Balance  Static Standing-Comment/Number of Minutes: FAIR-  Dynamic Standing Balance  Dynamic Standing-Comments: FAIR-  Functional Assessments:  Bed Mobility  Bed Mobility:  (SUPNE>SIT MIN A X1, VC FRO SAFETY MOVES FAST, SITS EOB 5 MIN SBA)    Transfers  Transfer:  (SIT<>STAND FWW MIN X1A + 1 FOR SAFETY, VC  FOR SAFETY, MOVES  QUICKLY)    Ambulation/Gait Training  Ambulation/Gait Training Performed:  (FW WBAT RLE AMB IN ROOM 15 FT VC FOR SEQUENCIGN AND SAFETY,  VC TO MOVE SLOWLY)  Extremity/Trunk Assessments:     Outcome Measures:  [facility] Basic Mobility  Turning from your back to your side while in a flat bed without using bedrails: A little  Moving from lying on your back to sitting on the side of a flat bed without using bedrails: A little  Moving to and from bed to chair (including a wheelchair): A lot  Standing up from a chair using your arms (e.g. wheelchair or bedside chair): A lot  To walk in hospital room: A lot  Climbing 3-5 steps with railing: A lot  Basic Mobility - Total Score: 14    Encounter Problems       Encounter Problems (Active)       Mobility       STG - Patient will ambulate (Not Progressing)       Start:  07/09/24    Expected End:  07/12/24        FT, WBAT RLE, CGA USING PROPER GAIT PATTERN         STG - Patient will ascend and descend 6-8stairs (Not Progressing)       Start:  07/09/24    Expected End:  07/11/24       RAIL MIN A CANE AND CURB STEP W/ FWW, WBAT RLE         Goal 1 (Not Progressing)       Start:  07/09/24    Expected End:  07/30/24       20 REPS AROM RLE RROM LLE INCREASING STRENGTH FOR STABLE GAIT            PT Transfers       STG - Patient to transfer to and from sit to supine (Not Progressing)       Start:  07/09/24    Expected End:  07/10/24       SBA HOB FLAT NO RAILS         STG - Patient will transfer sit to and from stand (Not Progressing)       Start:  07/09/24    Expected End:  07/10/24       FWW WBAT RLE USING PROPER TECHNIQUE CGA                Education Documentation  Mobility Training, taught by Juani Lemus, PT at 7/9/2024  1:13 PM.  Learner: Patient  Readiness: Acceptance  Method: Explanation  Response: Needs Reinforcement  Comment: POST IMN SURG PRECAUTIONS, FWW WBAT PRECAUTIONS    Education Comments  No comments found.

## 2024-07-09 NOTE — PROGRESS NOTES
Physical Therapy  Physical Therapy Treatment    Patient Name: Jeanna Haynes  MRN: [id number]  Today's Date: 7/9/2024  Time Calculation  Start Time: 1328  Stop Time: 1400  Time Calculation (min): 32 min    Assessment/Plan   PT Plan  Treatment/Interventions: Bed mobility, Transfer training, Gait training, Stair training, Strengthening, Endurance training  PT Plan: Ongoing PT  PT Frequency: 5 times per week (1-2X/W)  PT Discharge Recommendations: Low intensity level of continued care  Equipment Recommended upon Discharge:  (TBD)  PT Recommended Transfer Status: Assist x1, Assist x2 (FWW)  PT - OK to Discharge: Yes (WHEN MEDICALLY CLEARED)    General Visit Information:   PT  Visit  PT Received On: 07/09/24  Response to Previous Treatment: Patient with no complaints from previous session.    Reason for Referral: R hip fx, s/p IMN  Room: 3302  Pt pleasant, cooperative, and motivated. Spouse present and supportive.     Subjective   Precautions:  WBAT RLE    Objective   Pain:  pt reports 5/10 R hip pin with WBing, 2/10 pain at  rest    Cognition:  Oriented X4    Activity Tolerance:  Activity Tolerance  Endurance: Endurance does not limit participation in activity    Treatments:  Bed Mobility  Supine to sitting: Minimum assistance  Scooting: Minimum assistance    Transfers  Sit to stand: Minimum assistance  Stand to sit: Minimum assistance  Commode: Minimum assistance  Transfer Device: Walker    Ambulation/Gait Training  75 feet x1, 10 feet x1, 50 feet x1, 12 feet x1 with FWW, Julia  VC for sequencing, pattern, step length  No buckling or gross LOB    Stairs  Pt ascended/descended 4 steps with B rails, Julia  VC for sequence      Pt RTB following tx. Educated on RLE neutral alignment. Alarm on and call light in reach.     Outcome Measures:  [facility] Basic Mobility  Turning from your back to your side while in a flat bed without using bedrails: A little  Moving from lying on your back to sitting on the side of a flat bed without  using bedrails: A little  Moving to and from bed to chair (including a wheelchair): A little  Standing up from a chair using your arms (e.g. wheelchair or bedside chair): A little  To walk in hospital room: A lot  Climbing 3-5 steps with railing: A lot  Basic Mobility - Total Score: 16    Education Documentation  Mobility Training, taught by Damian Contreras PTA at 7/9/2024  2:07 PM.  Learner: Patient  Readiness: Acceptance  Method: Explanation, Demonstration  Response: Verbalizes Understanding, Demonstrated Understanding    Education Comments  No comments found.        OP EDUCATION:       Encounter Problems       Encounter Problems (Active)       Mobility       STG - Patient will ambulate (Progressing)       Start:  07/09/24    Expected End:  07/12/24        FT, WBAT RLE, CGA USING PROPER GAIT PATTERN         STG - Patient will ascend and descend 6-8stairs (Progressing)       Start:  07/09/24    Expected End:  07/11/24       RAIL MIN A CANE AND CURB STEP W/ FWW, WBAT RLE         Goal 1 (Progressing)       Start:  07/09/24    Expected End:  07/30/24       20 REPS AROM RLE RROM LLE INCREASING STRENGTH FOR STABLE GAIT            PT Transfers       STG - Patient to transfer to and from sit to supine (Progressing)       Start:  07/09/24    Expected End:  07/10/24       SBA HOB FLAT NO RAILS         STG - Patient will transfer sit to and from stand (Progressing)       Start:  07/09/24    Expected End:  07/10/24       FWW WBAT RLE USING PROPER TECHNIQUE CGA

## 2024-07-09 NOTE — PROGRESS NOTES
Jeanna Haynes is a 68 y.o. female on day 2 of admission presenting with Closed subcapital fracture of right femur, initial encounter (Multi).      Subjective   Jeanna Haynes is a 68 y.o. female with PMHx s/f HTN, DLD, mitral valve regurg s/p repair, hypothyroidism, prediabetes (A1c 6.4%), h/o PRES presented 7/7/24 with right hip pain.  Patient states she was walking to the car and stepped into the pothole in the parking lot, and fell onto her right hip.  She denies hitting her head, loss of consciousness.  She is currently on baby aspirin 81 mg.  She notes that she has no pain at rest, and excruciating pain with any movements.  CBC-leukocytes 15.4, neutrophils 12.68- suspect reactive. CT hip right-Subcapital neck fracture of the right hip. Sigmoid diverticulosis      7/9/2024: No acute events overnight. Vitals stable. Leukocytosis likely reactive to surgical fixation. No anemia but hgb did downtrend 15.3-> 14.1-> 12.9 as expected from fracture. PT/OT today, WBAT with walker per ortho. Remove bandage in 7 days, follow up in office in 2 weeks  She has pain only with movement of R hip. Awaiting PT eval today         Review of Systems   Constitutional:  Negative for appetite change, chills, diaphoresis, fatigue and fever.   HENT:  Negative for congestion, ear pain, facial swelling, hearing loss, nosebleeds, sore throat, tinnitus and trouble swallowing.    Eyes:  Negative for pain.   Respiratory:  Negative for cough, chest tightness, shortness of breath and wheezing.    Cardiovascular:  Negative for chest pain, palpitations and leg swelling.   Gastrointestinal:  Negative for abdominal pain, blood in stool, constipation, diarrhea, nausea and vomiting.   Genitourinary:  Negative for dysuria, flank pain, frequency, hematuria and urgency.   Musculoskeletal:  Negative for back pain and joint swelling.        R hip pain   Skin:  Negative for rash and wound.   Neurological:  Negative for dizziness, syncope, weakness,  light-headedness, numbness and headaches.   Hematological:  Does not bruise/bleed easily.   Psychiatric/Behavioral:  Negative for behavioral problems, hallucinations and suicidal ideas.           Objective     Last Recorded Vitals  /77 (BP Location: Left arm, Patient Position: Lying)   Pulse 79   Temp 37 °C (98.6 °F) (Temporal)   Resp 17   Wt 79.4 kg (175 lb)   SpO2 93%     Image Results  ECG 12 lead    Result Date: 7/8/2024  Sinus rhythm Ventricular trigeminy    CT hip right wo IV contrast    Result Date: 7/7/2024  STUDY: CT Extremity; Completed Time:  7/7/2024 5:56 PM. INDICATION: Evaluate for right hip fracture. COMPARISON: None available. ACCESSION NUMBER(S): [id number] ORDERING CLINICIAN: DOMITILA OBRIEN TECHNIQUE:  Thin section axial images were obtained through the right hip without intravenous contrast.  Orthogonal reconstructed images were obtained and reviewed.  Automated mA/kV exposure control was utilized and patient examination was performed in strict accordance with principles of ALARA. FINDINGS: There is a subcapital fracture of the right femoral neck.  The femoral head itself is not displaced with respect to the acetabulum.  No acute osseous findings are seen in the proximal right femoral shaft.  No acute findings are seen within the right iliac wing or right pubic ramus.  There is minimal edema within the surrounding musculature.  No hematoma is seen within the right hemipelvis.  There are diverticula seen in the sigmoid colon.    1.  Subcapital neck fracture of the right hip. 2.  Sigmoid diverticulosis. Signed by Kevin Booth MD    Urgent Care Xray    Result Date: 7/7/2024  Interpreted By:  Beau Hernandez, STUDY: XR URGENT CARE XRAY;  7/7/2024 2:22 pm   INDICATION: Signs/Symptoms:right hip pain.   COMPARISON: None.   ACCESSION NUMBER(S): [id number]   ORDERING CLINICIAN: FELIX HALL   TECHNIQUE: Two views of the right hip   FINDINGS: There is increased density along the subcapital  region on the lateral side of the right femoral neck. The femoral neck is otherwise not well profiled. There is marginal osteophyte formation along the right femoral head neck junction. No dislocation is evident. No radiopaque foreign body is evident.       Although the increased density at the subcapital region of the right femoral neck may represent a collar of osteophytes, subcapital fracture of the right femoral neck is not excluded on the basis of these radiographs. Knowledge of any trauma may be helpful. CT is recommended for further assessment.   MACRO: None   Signed by: Beau Hernandez 7/7/2024 2:54 PM Dictation workstation:   [id number]       Lab Results  Results for orders placed or performed during the hospital encounter of 07/07/24 (from the past 24 hour(s))   Basic Metabolic Panel   Result Value Ref Range    Glucose 104 (H) 74 - 99 mg/dL    Sodium 135 (L) 136 - 145 mmol/L    Potassium 4.3 3.5 - 5.3 mmol/L    Chloride 103 98 - 107 mmol/L    Bicarbonate 25 21 - 32 mmol/L    Anion Gap 11 10 - 20 mmol/L    Urea Nitrogen 12 6 - 23 mg/dL    Creatinine 0.82 0.50 - 1.05 mg/dL    eGFR 78 >60 mL/min/1.73m*2    Calcium 8.0 (L) 8.6 - 10.3 mg/dL   CBC   Result Value Ref Range    WBC 12.7 (H) 4.4 - 11.3 x10*3/uL    nRBC 0.0 0.0 - 0.0 /100 WBCs    RBC 4.04 4.00 - 5.20 x10*6/uL    Hemoglobin 12.9 12.0 - 16.0 g/dL    Hematocrit 38.2 36.0 - 46.0 %    MCV 95 80 - 100 fL    MCH 31.9 26.0 - 34.0 pg    MCHC 33.8 32.0 - 36.0 g/dL    RDW 14.0 11.5 - 14.5 %    Platelets 266 150 - 450 x10*3/uL   Magnesium   Result Value Ref Range    Magnesium 2.21 1.60 - 2.40 mg/dL        Medications  Scheduled medications:  amLODIPine, 10 mg, oral, Daily  aspirin, 325 mg, oral, BID  [Held by provider] aspirin, 81 mg, oral, Daily  docusate sodium, 100 mg, oral, BID  levothyroxine, 125 mcg, oral, Daily  losartan, 100 mg, oral, Daily  metoprolol succinate XL, 25 mg, oral, Daily  pantoprazole, 40 mg, oral, Daily before breakfast    Or  pantoprazole, 40 mg, intravenous, Daily before breakfast  polyethylene glycol, 17 g, oral, Daily      Continuous medications:  lactated Ringer's, 50 mL/hr, Last Rate: Stopped (07/08/24 1446)  oxygen, 2 L/min, Last Rate: Stopped (07/08/24 1545)  sodium chloride 0.9%, 100 mL/hr, Last Rate: 100 mL/hr (07/09/24 0500)      PRN medications:  PRN medications: acetaminophen, bisacodyl, bisacodyl, guaiFENesin, HYDROmorphone, melatonin, morphine, morphine, ondansetron     Physical Exam  Constitutional:       General: She is not in acute distress.     Appearance: Normal appearance.   HENT:      Head: Normocephalic and atraumatic.      Right Ear: External ear normal.      Left Ear: External ear normal.      Nose: Nose normal.      Mouth/Throat:      Mouth: Mucous membranes are moist.      Pharynx: Oropharynx is clear.   Eyes:      Extraocular Movements: Extraocular movements intact.      Conjunctiva/sclera: Conjunctivae normal.      Pupils: Pupils are equal, round, and reactive to light.   Cardiovascular:      Rate and Rhythm: Normal rate and regular rhythm.      Pulses: Normal pulses.      Heart sounds: Normal heart sounds.   Pulmonary:      Effort: Pulmonary effort is normal. No respiratory distress.      Breath sounds: Normal breath sounds. No wheezing, rhonchi or rales.   Abdominal:      General: Bowel sounds are normal.      Palpations: Abdomen is soft.      Tenderness: There is no abdominal tenderness. There is no right CVA tenderness, left CVA tenderness, guarding or rebound.   Musculoskeletal:      Cervical back: Normal range of motion and neck supple.      Comments: Did not attempt ROM R hip  Post-operative dressing is C/D/I, did not remove dressing  NVS intact distal to operative site     Skin:     General: Skin is warm and dry.      Capillary Refill: Capillary refill takes less than 2 seconds.      Findings: No lesion or rash.   Neurological:      General: No focal deficit present.      Mental Status: She is  alert and oriented to person, place, and time. Mental status is at baseline.   Psychiatric:         Mood and Affect: Mood normal.         Behavior: Behavior normal.                  Code Status  Full Code     Assessment/Plan      Closed subcapital fracture of right femur  -RCRI 0  -CT hip right-Subcapital neck fracture of the right hip  -orthopedics recommendations appreciated  -OR for right hip ORIF tomorrow  -NPO at midnight  -pain meds PRN     Ventricular trigeminy  -Incidental finding, not symptomatic  -ECG confirms  -Monitor on tele  -Keep K >4.0 and Mg >2.0  -Supplement magnesium  -Discussed with Dr. Masters    HTN  -Continue home medications   -Monitor and adjust as needed while admitted     Hypothyroidism  -Continue home medications     Hx MV repair  -follows outpatient cardiology  -no anginal symptoms currently  -Echo on 12/11/2023 shows LVEF normal    Hx PRES syndrome  -diagnosed in 05/22, no overt etiology for PRES was found by history and believed to be caused by her HTN  -continues home meds    DVT ppx: ASA 325mg BID x4 weeks per ortho       Please see orders for more complete plan    Laura Wilde PA-C

## 2024-07-09 NOTE — PROGRESS NOTES
07/09/24 1113   Discharge Planning   Living Arrangements Spouse/significant other   Support Systems Spouse/significant other   Assistance Needed None   Type of Residence Private residence   Home or Post Acute Services In home services   Type of Home Care Services Home PT   Patient expects to be discharged to: Home with [facility]     Discharge assessment complete. Patient lives at home with spouse and is completely independent at home. Patient confirmed PCP as Sharla. Patient expects to return home but open to [facility]. PT/OT Pending. TCC following.     14:52 pm: Patient was provided with a [employer] list of skilled [facility] agencies that are in network with patient's insurance payor, service patient's preferred geographic region, and that displays CMS star ratings. Patient requested to utilize [facility] notified of request upon patient discharge.

## 2024-07-09 NOTE — PROGRESS NOTES
"Jeanna Haynes is a 68 y.o. female on day 2 of admission presenting with Closed subcapital fracture of right femur, initial encounter (Multi).    Subjective   Patient is postop day 1 ORIF femoral neck fracture on the right.  She did well overnight with pain controlled.  Local pain medication is now wearing off and she will probably need some pain management for couple days.  Otherwise no other constitutional symptoms.       Objective     Operative lower extremity in neutral alignment.  Hip dressing dry and intact.  Passive motion to hip with only mild discomfort appropriate for postoperative timeframe.  Dermis is intact.  Neurovascular status is intact.  Minimal swelling, no signs of compartment syndrome.  Negative Homans.     Last Recorded Vitals  Blood pressure 116/66, pulse 75, temperature 37.1 °C (98.7 °F), temperature source Temporal, resp. rate 17, height 1.651 m (5' 5\"), weight 79.4 kg (175 lb), SpO2 92%.  Intake/Output last 3 Shifts:  I/O last 3 completed shifts:  In: 2046.3 (25.8 mL/kg) [I.V.:1846.3 (23.3 mL/kg); IV Piggyback:200]  Out: 1280 (16.1 mL/kg) [Urine:1250 (0.4 mL/kg/hr); Blood:30]  Weight: 79.4 kg     Relevant Results      Scheduled medications  amLODIPine, 10 mg, oral, Daily  aspirin, 325 mg, oral, BID  [Held by provider] aspirin, 81 mg, oral, Daily  docusate sodium, 100 mg, oral, BID  heparin (porcine), 5,000 Units, subcutaneous, q8h CHONG  levothyroxine, 125 mcg, oral, Daily  losartan, 100 mg, oral, Daily  metoprolol succinate XL, 25 mg, oral, Daily  pantoprazole, 40 mg, oral, Daily before breakfast   Or  pantoprazole, 40 mg, intravenous, Daily before breakfast  polyethylene glycol, 17 g, oral, Daily      Continuous medications  lactated Ringer's, 50 mL/hr, Last Rate: Stopped (07/08/24 1446)  oxygen, 2 L/min, Last Rate: Stopped (07/08/24 1545)  sodium chloride 0.9%, 100 mL/hr, Last Rate: 100 mL/hr (07/09/24 0500)      PRN medications  PRN medications: acetaminophen, bisacodyl, bisacodyl, " guaiFENesin, HYDROmorphone, melatonin, morphine, morphine, ondansetron  Results for orders placed or performed during the hospital encounter of 07/07/24 (from the past 24 hour(s))   ECG 12 lead   Result Value Ref Range    Ventricular Rate 58 BPM    Atrial Rate 60 BPM    WY Interval 205 ms    QRS Duration 88 ms    QT Interval 420 ms    QTC Calculation(Bazett) 413 ms    P Axis 53 degrees    R Axis 60 degrees    T Axis 81 degrees    QRS Count 10 beats    Q Onset 252 ms    T Offset 462 ms    QTC Fredericia 415 ms   Basic Metabolic Panel   Result Value Ref Range    Glucose 104 (H) 74 - 99 mg/dL    Sodium 135 (L) 136 - 145 mmol/L    Potassium 4.3 3.5 - 5.3 mmol/L    Chloride 103 98 - 107 mmol/L    Bicarbonate 25 21 - 32 mmol/L    Anion Gap 11 10 - 20 mmol/L    Urea Nitrogen 12 6 - 23 mg/dL    Creatinine 0.82 0.50 - 1.05 mg/dL    eGFR 78 >60 mL/min/1.73m*2    Calcium 8.0 (L) 8.6 - 10.3 mg/dL   CBC   Result Value Ref Range    WBC 12.7 (H) 4.4 - 11.3 x10*3/uL    nRBC 0.0 0.0 - 0.0 /100 WBCs    RBC 4.04 4.00 - 5.20 x10*6/uL    Hemoglobin 12.9 12.0 - 16.0 g/dL    Hematocrit 38.2 36.0 - 46.0 %    MCV 95 80 - 100 fL    MCH 31.9 26.0 - 34.0 pg    MCHC 33.8 32.0 - 36.0 g/dL    RDW 14.0 11.5 - 14.5 %    Platelets 266 150 - 450 x10*3/uL   Magnesium   Result Value Ref Range    Magnesium 2.21 1.60 - 2.40 mg/dL                            Assessment/Plan   Principal Problem:    Closed subcapital fracture of right femur, initial encounter (Multi)  Active Problems:    H/O mitral valve repair    Hypertension    Mitral regurgitation    Mixed hyperlipidemia    PRES (posterior reversible encephalopathy syndrome)    Hypothyroidism    Overweight with body mass index (BMI) 25.0-29.9    Doing well status post ORIF hip fracture.  Would be allowed to weight-bear as tolerated will but will probably start off needing assistance and definitely want her to use a walker and avoid missteps and falls for at least another 6 to 12-week range.  She might be  a good candidate to discharge home with home care, will see how she proceeds with physical therapy in hospital.  Upon discharge she would follow-up in my office 2 weeks postop for staple removal and x-rays.  Current bandage can come off 7 days postop.       I spent 10 minutes in the professional and overall care of this patient.      Joel Jackson MD

## 2024-07-09 NOTE — CARE PLAN
The patient's goals for the shift include      The clinical goals for the shift include Patient safety will be maintained this shift      Problem: Fall/Injury  Goal: Not fall by end of shift  Outcome: Progressing  Goal: Be free from injury by end of the shift  Outcome: Progressing  Goal: Verbalize understanding of personal risk factors for fall in the hospital  Outcome: Progressing  Goal: Verbalize understanding of risk factor reduction measures to prevent injury from fall in the home  Outcome: Progressing  Goal: Use assistive devices by end of the shift  Outcome: Progressing  Goal: Pace activities to prevent fatigue by end of the shift  Outcome: Progressing     Problem: Pain  Goal: Takes deep breaths with improved pain control throughout the shift  Outcome: Progressing  Goal: Turns in bed with improved pain control throughout the shift  Outcome: Progressing  Goal: Walks with improved pain control throughout the shift  Outcome: Progressing  Goal: Performs ADL's with improved pain control throughout shift  Outcome: Progressing  Goal: Participates in PT with improved pain control throughout the shift  Outcome: Progressing  Goal: Free from opioid side effects throughout the shift  Outcome: Progressing  Goal: Free from acute confusion related to pain meds throughout the shift  Outcome: Progressing     Problem: Skin  Goal: Decreased wound size/increased tissue granulation at next dressing change  Outcome: Progressing  Goal: Participates in plan/prevention/treatment measures  Outcome: Progressing  Goal: Prevent/manage excess moisture  Outcome: Progressing  Goal: Prevent/minimize sheer/friction injuries  Outcome: Progressing  Goal: Promote/optimize nutrition  Outcome: Progressing  Goal: Promote skin healing  Outcome: Progressing

## 2024-07-09 NOTE — CARE PLAN
The patient's goals for the shift include      The clinical goals for the shift include remain hemodynamically sdtable through shift    Over the shift, the patient did not make progress toward the following goals. Barriers to progression include n/a. Recommendations to address these barriers include n/a.

## 2024-07-10 ENCOUNTER — HOME HEALTH ADMISSION (OUTPATIENT)
Dept: HOME HEALTH SERVICES | Facility: HOME HEALTH | Age: 69
End: 2024-07-10
Payer: MEDICARE

## 2024-07-10 ENCOUNTER — DOCUMENTATION (OUTPATIENT)
Dept: HOME HEALTH SERVICES | Facility: HOME HEALTH | Age: 69
End: 2024-07-10
Payer: MEDICARE

## 2024-07-10 ENCOUNTER — APPOINTMENT (OUTPATIENT)
Dept: CARDIOLOGY | Facility: HOSPITAL | Age: 69
End: 2024-07-10
Payer: MEDICARE

## 2024-07-10 ENCOUNTER — PHARMACY VISIT (OUTPATIENT)
Dept: PHARMACY | Facility: CLINIC | Age: 69
End: 2024-07-10
Payer: COMMERCIAL

## 2024-07-10 VITALS
DIASTOLIC BLOOD PRESSURE: 90 MMHG | HEART RATE: 81 BPM | OXYGEN SATURATION: 95 % | SYSTOLIC BLOOD PRESSURE: 146 MMHG | WEIGHT: 175 LBS | TEMPERATURE: 96.8 F | BODY MASS INDEX: 29.16 KG/M2 | HEIGHT: 65 IN | RESPIRATION RATE: 18 BRPM

## 2024-07-10 LAB
ANION GAP SERPL CALC-SCNC: 11 MMOL/L (ref 10–20)
BUN SERPL-MCNC: 14 MG/DL (ref 6–23)
CALCIUM SERPL-MCNC: 8.4 MG/DL (ref 8.6–10.3)
CHLORIDE SERPL-SCNC: 105 MMOL/L (ref 98–107)
CO2 SERPL-SCNC: 23 MMOL/L (ref 21–32)
CREAT SERPL-MCNC: 0.81 MG/DL (ref 0.5–1.05)
EGFRCR SERPLBLD CKD-EPI 2021: 79 ML/MIN/1.73M*2
ERYTHROCYTE [DISTWIDTH] IN BLOOD BY AUTOMATED COUNT: 14.3 % (ref 11.5–14.5)
GLUCOSE SERPL-MCNC: 91 MG/DL (ref 74–99)
HCT VFR BLD AUTO: 40.6 % (ref 36–46)
HGB BLD-MCNC: 13.2 G/DL (ref 12–16)
MAGNESIUM SERPL-MCNC: 2.12 MG/DL (ref 1.6–2.4)
MCH RBC QN AUTO: 31 PG (ref 26–34)
MCHC RBC AUTO-ENTMCNC: 32.5 G/DL (ref 32–36)
MCV RBC AUTO: 95 FL (ref 80–100)
NRBC BLD-RTO: 0 /100 WBCS (ref 0–0)
PLATELET # BLD AUTO: 268 X10*3/UL (ref 150–450)
POTASSIUM SERPL-SCNC: 4.2 MMOL/L (ref 3.5–5.3)
RBC # BLD AUTO: 4.26 X10*6/UL (ref 4–5.2)
SODIUM SERPL-SCNC: 135 MMOL/L (ref 136–145)
WBC # BLD AUTO: 11.3 X10*3/UL (ref 4.4–11.3)

## 2024-07-10 PROCEDURE — 97116 GAIT TRAINING THERAPY: CPT | Mod: GP,CQ

## 2024-07-10 PROCEDURE — 2500000001 HC RX 250 WO HCPCS SELF ADMINISTERED DRUGS (ALT 637 FOR MEDICARE OP): Performed by: SPECIALIST

## 2024-07-10 PROCEDURE — 36415 COLL VENOUS BLD VENIPUNCTURE: CPT | Performed by: SPECIALIST

## 2024-07-10 PROCEDURE — 85027 COMPLETE CBC AUTOMATED: CPT | Performed by: SPECIALIST

## 2024-07-10 PROCEDURE — 2500000001 HC RX 250 WO HCPCS SELF ADMINISTERED DRUGS (ALT 637 FOR MEDICARE OP): Performed by: STUDENT IN AN ORGANIZED HEALTH CARE EDUCATION/TRAINING PROGRAM

## 2024-07-10 PROCEDURE — 80048 BASIC METABOLIC PNL TOTAL CA: CPT | Performed by: SPECIALIST

## 2024-07-10 PROCEDURE — RXMED WILLOW AMBULATORY MEDICATION CHARGE

## 2024-07-10 PROCEDURE — 2500000004 HC RX 250 GENERAL PHARMACY W/ HCPCS (ALT 636 FOR OP/ED): Performed by: SPECIALIST

## 2024-07-10 PROCEDURE — 99239 HOSP IP/OBS DSCHRG MGMT >30: CPT | Performed by: PHYSICIAN ASSISTANT

## 2024-07-10 PROCEDURE — 97110 THERAPEUTIC EXERCISES: CPT | Mod: GP,CQ

## 2024-07-10 PROCEDURE — 83735 ASSAY OF MAGNESIUM: CPT | Performed by: SPECIALIST

## 2024-07-10 RX ORDER — OXYCODONE HYDROCHLORIDE 10 MG/1
10 TABLET ORAL EVERY 4 HOURS PRN
Qty: 18 TABLET | Refills: 0 | Status: SHIPPED | OUTPATIENT
Start: 2024-07-10

## 2024-07-10 RX ORDER — ASPIRIN 325 MG
325 TABLET, DELAYED RELEASE (ENTERIC COATED) ORAL 2 TIMES DAILY
Qty: 56 TABLET | Refills: 0 | Status: SHIPPED | OUTPATIENT
Start: 2024-07-10 | End: 2024-08-07

## 2024-07-10 RX ORDER — POLYETHYLENE GLYCOL 3350 17 G/17G
17 POWDER, FOR SOLUTION ORAL DAILY
Qty: 119 G | Refills: 0 | Status: SHIPPED | OUTPATIENT
Start: 2024-07-10

## 2024-07-10 RX ADMIN — PANTOPRAZOLE SODIUM 40 MG: 40 TABLET, DELAYED RELEASE ORAL at 05:25

## 2024-07-10 RX ADMIN — LEVOTHYROXINE SODIUM 125 MCG: 125 TABLET ORAL at 05:27

## 2024-07-10 RX ADMIN — OXYCODONE HYDROCHLORIDE 10 MG: 10 TABLET ORAL at 07:48

## 2024-07-10 RX ADMIN — ASPIRIN 325 MG: 325 TABLET, COATED ORAL at 09:51

## 2024-07-10 RX ADMIN — POLYETHYLENE GLYCOL 3350 17 G: 17 POWDER, FOR SOLUTION ORAL at 09:51

## 2024-07-10 RX ADMIN — DOCUSATE SODIUM 100 MG: 100 CAPSULE, LIQUID FILLED ORAL at 09:50

## 2024-07-10 RX ADMIN — AMLODIPINE BESYLATE 10 MG: 10 TABLET ORAL at 09:50

## 2024-07-10 RX ADMIN — OXYCODONE HYDROCHLORIDE 10 MG: 10 TABLET ORAL at 02:48

## 2024-07-10 RX ADMIN — LOSARTAN POTASSIUM 100 MG: 50 TABLET, FILM COATED ORAL at 09:50

## 2024-07-10 SDOH — ECONOMIC STABILITY: TRANSPORTATION INSECURITY
IN THE PAST 12 MONTHS, HAS THE LACK OF TRANSPORTATION KEPT YOU FROM MEDICAL APPOINTMENTS OR FROM GETTING MEDICATIONS?: NO

## 2024-07-10 SDOH — ECONOMIC STABILITY: HOUSING INSECURITY: IN THE PAST 12 MONTHS, HOW MANY TIMES HAVE YOU MOVED WHERE YOU WERE LIVING?: 1

## 2024-07-10 SDOH — ECONOMIC STABILITY: HOUSING INSECURITY: AT ANY TIME IN THE PAST 12 MONTHS, WERE YOU HOMELESS OR LIVING IN A SHELTER (INCLUDING NOW)?: NO

## 2024-07-10 SDOH — ECONOMIC STABILITY: HOUSING INSECURITY: IN THE LAST 12 MONTHS, WAS THERE A TIME WHEN YOU WERE NOT ABLE TO PAY THE MORTGAGE OR RENT ON TIME?: NO

## 2024-07-10 SDOH — ECONOMIC STABILITY: INCOME INSECURITY: HOW HARD IS IT FOR YOU TO PAY FOR THE VERY BASICS LIKE FOOD, HOUSING, MEDICAL CARE, AND HEATING?: NOT HARD AT ALL

## 2024-07-10 SDOH — ECONOMIC STABILITY: INCOME INSECURITY: IN THE LAST 12 MONTHS, WAS THERE A TIME WHEN YOU WERE NOT ABLE TO PAY THE MORTGAGE OR RENT ON TIME?: NO

## 2024-07-10 SDOH — ECONOMIC STABILITY: TRANSPORTATION INSECURITY
IN THE PAST 12 MONTHS, HAS LACK OF TRANSPORTATION KEPT YOU FROM MEETINGS, WORK, OR FROM GETTING THINGS NEEDED FOR DAILY LIVING?: NO

## 2024-07-10 SDOH — ECONOMIC STABILITY: FOOD INSECURITY: HOW HARD IS IT FOR YOU TO PAY FOR THE VERY BASICS LIKE FOOD, HOUSING, MEDICAL CARE, AND HEATING?: NOT HARD AT ALL

## 2024-07-10 SDOH — ECONOMIC STABILITY: TRANSPORTATION INSECURITY: IN THE PAST 12 MONTHS, HAS LACK OF TRANSPORTATION KEPT YOU FROM MEDICAL APPOINTMENTS OR FROM GETTING MEDICATIONS?: NO

## 2024-07-10 ASSESSMENT — COGNITIVE AND FUNCTIONAL STATUS - GENERAL
MOBILITY SCORE: 18
MOVING TO AND FROM BED TO CHAIR: A LITTLE
TOILETING: A LITTLE
MOVING TO AND FROM BED TO CHAIR: A LITTLE
CLIMB 3 TO 5 STEPS WITH RAILING: A LITTLE
STANDING UP FROM CHAIR USING ARMS: A LITTLE
CLIMB 3 TO 5 STEPS WITH RAILING: TOTAL
HELP NEEDED FOR BATHING: A LITTLE
DAILY ACTIVITIY SCORE: 20
MOVING FROM LYING ON BACK TO SITTING ON SIDE OF FLAT BED WITH BEDRAILS: A LITTLE
WALKING IN HOSPITAL ROOM: A LITTLE
DRESSING REGULAR LOWER BODY CLOTHING: A LOT
TURNING FROM BACK TO SIDE WHILE IN FLAT BAD: A LITTLE
STANDING UP FROM CHAIR USING ARMS: A LITTLE
WALKING IN HOSPITAL ROOM: A LOT
MOBILITY SCORE: 15
TURNING FROM BACK TO SIDE WHILE IN FLAT BAD: A LITTLE
MOVING FROM LYING ON BACK TO SITTING ON SIDE OF FLAT BED WITH BEDRAILS: A LITTLE

## 2024-07-10 ASSESSMENT — PAIN SCALES - GENERAL
PAINLEVEL_OUTOF10: 10 - WORST POSSIBLE PAIN
PAINLEVEL_OUTOF10: 0 - NO PAIN
PAINLEVEL_OUTOF10: 8
PAINLEVEL_OUTOF10: 3

## 2024-07-10 ASSESSMENT — PAIN - FUNCTIONAL ASSESSMENT
PAIN_FUNCTIONAL_ASSESSMENT: 0-10
PAIN_FUNCTIONAL_ASSESSMENT: 0-10

## 2024-07-10 ASSESSMENT — PAIN DESCRIPTION - ORIENTATION: ORIENTATION: RIGHT

## 2024-07-10 ASSESSMENT — ACTIVITIES OF DAILY LIVING (ADL): LACK_OF_TRANSPORTATION: NO

## 2024-07-10 ASSESSMENT — PAIN DESCRIPTION - LOCATION: LOCATION: HIP

## 2024-07-10 NOTE — CARE PLAN
The patient's goals for the shift include      The clinical goals for the shift include Pt pain will be a 6 or less throughout shift      Problem: Fall/Injury  Goal: Not fall by end of shift  Outcome: Progressing  Goal: Be free from injury by end of the shift  Outcome: Progressing  Goal: Verbalize understanding of personal risk factors for fall in the hospital  Outcome: Progressing  Goal: Verbalize understanding of risk factor reduction measures to prevent injury from fall in the home  Outcome: Progressing  Goal: Use assistive devices by end of the shift  Outcome: Progressing  Goal: Pace activities to prevent fatigue by end of the shift  Outcome: Progressing     Problem: Pain  Goal: Takes deep breaths with improved pain control throughout the shift  Outcome: Progressing  Goal: Turns in bed with improved pain control throughout the shift  Outcome: Progressing  Goal: Walks with improved pain control throughout the shift  Outcome: Progressing  Goal: Performs ADL's with improved pain control throughout shift  Outcome: Progressing  Goal: Participates in PT with improved pain control throughout the shift  Outcome: Progressing  Goal: Free from opioid side effects throughout the shift  Outcome: Progressing  Goal: Free from acute confusion related to pain meds throughout the shift  Outcome: Progressing     Problem: Skin  Goal: Decreased wound size/increased tissue granulation at next dressing change  Outcome: Progressing  Flowsheets (Taken 7/10/2024 0751)  Decreased wound size/increased tissue granulation at next dressing change: Promote sleep for wound healing  Goal: Participates in plan/prevention/treatment measures  Outcome: Progressing  Flowsheets (Taken 7/10/2024 0751)  Participates in plan/prevention/treatment measures: Increase activity/out of bed for meals  Goal: Prevent/manage excess moisture  Outcome: Progressing  Flowsheets (Taken 7/10/2024 0751)  Prevent/manage excess moisture:   Moisturize dry skin   Monitor  for/manage infection if present   Follow provider orders for dressing changes  Goal: Prevent/minimize sheer/friction injuries  Outcome: Progressing  Flowsheets (Taken 7/10/2024 0751)  Prevent/minimize sheer/friction injuries:   Increase activity/out of bed for meals   Use pull sheet   HOB 30 degrees or less   Turn/reposition every 2 hours/use positioning/transfer devices  Goal: Promote/optimize nutrition  Outcome: Progressing  Flowsheets (Taken 7/10/2024 0751)  Promote/optimize nutrition:   Consume > 50% meals/supplements   Monitor/record intake including meals  Goal: Promote skin healing  Outcome: Progressing  Flowsheets (Taken 7/10/2024 0751)  Promote skin healing: Turn/reposition every 2 hours/use positioning/transfer devices     Pt resting in bed. Medicated for pain. No other requests or concerns at this time

## 2024-07-10 NOTE — HH CARE COORDINATION
Home Care received a Referral for Nursing, Physical Therapy, and Occupational Therapy. We have processed the referral for a Start of Care on 7.11 or 7.12.     If you have any questions or concerns, please feel free to contact us at [phone]. Follow the prompts, enter your five digit zip code, and you will be directed to your care team on [facility].

## 2024-07-10 NOTE — CARE PLAN
The patient's goals for the shift include      The clinical goals for the shift include remain hemodynamically sdtable through shift

## 2024-07-10 NOTE — NURSING NOTE
Patient discharged to home with . Discharge instructions, paperwork, and home going medications given to patient. Patient has no further questions and is comfortable.

## 2024-07-10 NOTE — DISCHARGE SUMMARY
Admission Date: 7/7/2024  3:56 PM  Discharge Date: 07/10/24   Condition at discharge: Stable    Discharge Diagnosis  Close subcapital fracture of right femur, RCRI 0  HTN  Hypothyroidism  Hx MV repair  Hx PRES syndrome  Ventricular trigeminy    Test Results Pending At Discharge  Pending Labs       No current pending labs.            Hospital Course   Jeanna Haynes is a 68 y.o. female with PMHx s/f HTN, DLD, mitral valve regurg s/p repair, hypothyroidism, prediabetes (A1c 6.4%), h/o PRES presented 7/7/24 with right hip pain. Patient states she was walking to the car and stepped into the pothole in the parking lot, and fell onto her right hip. She denies hitting her head, loss of consciousness. She is currently on baby aspirin 81 mg. She notes that she has no pain at rest, and excruciating pain with any movements. CBC-leukocytes 15.4, neutrophils 12.68- suspect reactive. CT hip right-Subcapital neck fracture of the right hip. Sigmoid diverticulosis. She is s/p ORIF with hip IM nailing 7/8/24. She remained stable throughout post-operative course and pain is tolerable, she is asking for discharge to home with home care.  Orthopedics recommends WBAT with walker as able, maintain dressing for 7 days then can remove, follow-up in office in 2 weeks, continue aspirin 325 mg twice daily for 4 weeks.  Patient incidentally noted to have ventricular trigeminy on telemetry preoperatively, confirmed with ECG.  Patient completely asymptomatic of this.  Offered echocardiogram however patient refused stating she is a patient of Dr. Dominguez and would follow-up with him regarding if this was medically necessary and for further treatment.    Consultations: Orthopedic Surgery consulted- treatment options were discussed and plan of care agreed upon.    Pertinent Physical Exam At Time of Discharge  Constitutional:       General: She is not in acute distress.  Sitting upright in bedside chair     Appearance: Normal appearance.   HENT:       Head: Normocephalic and atraumatic.      Right Ear: External ear normal.      Left Ear: External ear normal.      Nose: Nose normal.      Mouth/Throat:      Mouth: Mucous membranes are moist.      Pharynx: Oropharynx is clear.   Eyes:      Extraocular Movements: Extraocular movements intact.      Conjunctiva/sclera: Conjunctivae normal.      Pupils: Pupils are equal, round, and reactive to light.   Cardiovascular:      Rate and Rhythm: Normal rate and regular rhythm.      Pulses: Normal pulses.      Heart sounds: Normal heart sounds.   Pulmonary:      Effort: Pulmonary effort is normal. No respiratory distress.      Breath sounds: Normal breath sounds. No wheezing, rhonchi or rales.   Abdominal:      General: Bowel sounds are normal.      Palpations: Abdomen is soft.      Tenderness: There is no abdominal tenderness. There is no right CVA tenderness, left CVA tenderness, guarding or rebound.   Musculoskeletal:      Cervical back: Normal range of motion and neck supple.      Comments: Did not attempt ROM R hip  Post-operative dressing is C/D/I, did not remove dressing  NVS intact distal to operative site     Skin:     General: Skin is warm and dry.      Capillary Refill: Capillary refill takes less than 2 seconds.      Findings: No lesion or rash.   Neurological:      General: No focal deficit present.      Mental Status: She is alert and oriented to person, place, and time. Mental status is at baseline.   Psychiatric:         Mood and Affect: Mood normal.         Behavior: Behavior normal.     Code Status  Full Code     Home Medications     Medication List      START taking these medications     oxyCODONE 10 mg immediate release tablet; Commonly known as: Roxicodone;   Take 1 tablet (10 mg) by mouth every 4 hours if needed for moderate pain   (4 - 6) or severe pain (7 - 10).   polyethylene glycol 17 gram/dose powder; Commonly known as: Glycolax,   Miralax; Take 17 g by mouth once daily.     CHANGE how you take these  medications     aspirin 325 mg EC tablet; Take 1 tablet (325 mg) by mouth 2 times a day   for 28 days. Then resume normal 81mg tablet once daily; What changed:   medication strength, how much to take, when to take this, additional   instructions     CONTINUE taking these medications     amLODIPine 10 mg tablet; Commonly known as: Norvasc; Take 1 tablet (10   mg) by mouth once daily.   amoxicillin 500 mg capsule; Commonly known as: Amoxil; Take 4 PO one   hour before dental appointment   cetirizine 10 mg tablet; Commonly known as: ZyrTEC   cholecalciferol 25 MCG (1000 UT) tablet; Commonly known as: Vitamin D-3   levothyroxine 125 mcg tablet; Commonly known as: Synthroid, Levoxyl;   Take 1 tablet (125 mcg) by mouth once daily.   losartan 100 mg tablet; Commonly known as: Cozaar; Take 1 tablet (100   mg) by mouth once daily.   magnesium 200 mg tablet   nebivolol 10 mg tablet; Commonly known as: Bystolic; Take 1 tablet (10   mg) by mouth once daily.   potassium gluconate 600 mg (99 mg) tablet       Outpatient Follow-Up  Future Appointments   Date Time Provider Department Center   12/11/2024 10:00 AM ALEJANDRO Galvan-CNP [address]         At the time of discharge, patient's pain was controlled with oral analgesia, patient was urinating, having BMs, sleeping, and eating well. Follow up recommendations are in discharge paperwork. Discharge plan was discussed with the patient/family and all of the questions were answered. Medications were ordered to be delivered to bedside prior to discharge.     Discharge planning took greater than 35 minutes    Diagnoses at time of discharge:  Close subcapital fracture of right femur, RCRI 0  HTN  Hypothyroidism  Hx MV repair  Hx PRES syndrome  Ventricular trigeminy    Anticipated discharge destination: home with [facility]    Please see orders for more complete plan    Laura Wilde PA-C

## 2024-07-10 NOTE — PROGRESS NOTES
Physical Therapy    Physical Therapy Treatment    Patient Name: Jeanna Haynes  MRN: [id number]  Today's Date: 7/10/2024  Time Calculation  Start Time: 0845  Stop Time: 0915  Time Calculation (min): 30 min    Assessment/Plan   PT Assessment  End of Session Communication: Bedside nurse, PCT/NA/CTA  End of Session Patient Position: Up in chair, Alarm on     PT Plan  Treatment/Interventions: Bed mobility, Transfer training, Gait training, Stair training, Strengthening, Endurance training  PT Plan: Ongoing PT  PT Frequency: 5 times per week (1-2X/W)  PT Discharge Recommendations: Low intensity level of continued care  Equipment Recommended upon Discharge:  (TBD)  PT Recommended Transfer Status: Assist x1, Assist x2 (FWW)  PT - OK to Discharge: Yes (WHEN MEDICALLY CLEARED)    General Visit Information:   PT  Visit  PT Received On: 07/10/24  General  Prior to Session Communication: Bedside nurse  Patient Position Received: Bed, 3 rail up, Alarm on    Subjective   Precautions:  Precautions  LE Weight Bearing Status: Weight Bearing as Tolerated (RLE)  Medical Precautions: Fall precautions    Objective   Pain:  Pain Assessment  Pain Assessment: 0-10  0-10 (Numeric) Pain Score:  (no rating)  Pain Location: Hip  Pain Orientation: Right  Cognition:  Cognition  Orientation Level: Oriented X4    Treatments:  Therapeutic Exercise  Therapeutic Exercise Performed: Yes  Therapeutic Exercise Activity 1: pt completed supine LE exercises: AP x 10 reps, heel slides x 10 reps, hip abduction x 10 reps, SAQ x 10 reps, glut sets x 10 reps    Bed Mobility  Bed Mobility: Yes  Bed Mobility 1  Bed Mobility 1: Supine to sitting  Level of Assistance 1: Minimum assistance, Minimal verbal cues    Ambulation/Gait Training  Ambulation/Gait Training Performed: Yes  Ambulation/Gait Training 1  Surface 1: Level tile  Device 1: Rolling walker  Assistance 1: Contact guard, Minimal verbal cues  Quality of Gait 1: Diminished heel strike, Decreased step length,  Inconsistent stride length, Forward flexed posture  Comments/Distance (ft) 1: 100 feet; 25 feet x 2; cues to increase step length and heel strike  Transfers  Transfer: Yes  Transfer 1  Technique 1: Sit to stand, Stand to sit  Transfer Device 1: Walker  Transfer Level of Assistance 1: Contact guard  Transfers 2  Transfer to 2: Toilet  Transfer Device 2:  (grab bars)  Transfer Level of Assistance 2: Contact guard    Outcome Measures:  [facility] Basic Mobility  Turning from your back to your side while in a flat bed without using bedrails: A little  Moving from lying on your back to sitting on the side of a flat bed without using bedrails: A little  Moving to and from bed to chair (including a wheelchair): A little  Standing up from a chair using your arms (e.g. wheelchair or bedside chair): A little  To walk in hospital room: A little  Climbing 3-5 steps with railing: A little  Basic Mobility - Total Score: 18    Education Documentation  Mobility Training, taught by Ashanti iRos PTA at 7/10/2024  1:03 PM.  Learner: Patient  Readiness: Acceptance  Method: Explanation  Response: Verbalizes Understanding    Education Comments  No comments found.        OP EDUCATION:       Encounter Problems       Encounter Problems (Resolved)       Mobility       STG - Patient will ambulate (Adequate for Discharge)       Start:  07/09/24    Expected End:  07/12/24    Resolved:  07/10/24     FT, WBAT RLE, CGA USING PROPER GAIT PATTERN         STG - Patient will ascend and descend 6-8stairs (Adequate for Discharge)       Start:  07/09/24    Expected End:  07/11/24    Resolved:  07/10/24    RAIL MIN A CANE AND CURB STEP W/ FWW, WBAT RLE         Goal 1 (Adequate for Discharge)       Start:  07/09/24    Expected End:  07/30/24    Resolved:  07/10/24    20 REPS AROM RLE RROM LLE INCREASING STRENGTH FOR STABLE GAIT            PT Transfers       STG - Patient to transfer to and from sit to supine (Adequate for Discharge)       Start:   07/09/24    Expected End:  07/10/24    Resolved:  07/10/24    SBA HOB FLAT NO RAILS         STG - Patient will transfer sit to and from stand (Adequate for Discharge)       Start:  07/09/24    Expected End:  07/10/24    Resolved:  07/10/24    FWW WBAT RLE USING PROPER TECHNIQUE CGA            Pain - Adult

## 2024-07-10 NOTE — PROGRESS NOTES
Social work consult placed for positive medical risk screen. SW reviewed pt's chart and communicated with TCC. No SW needs foreseen at this time. SW signing off; available upon request.    CORA Michael, LSW ([id number])   Care Transitions

## 2024-07-11 ENCOUNTER — PATIENT OUTREACH (OUTPATIENT)
Dept: PRIMARY CARE | Facility: CLINIC | Age: 69
End: 2024-07-11
Payer: MEDICARE

## 2024-07-11 NOTE — PROGRESS NOTES
Pt declining TCM services at this time. Pt states she will follow with specialists and make follow up appt when she is able to get around better. Pt has no questions regarding medications or discharge instructions when asked. Pt states she is doing about as well as she can be given her situation.

## 2024-07-12 ENCOUNTER — HOME CARE VISIT (OUTPATIENT)
Dept: HOME HEALTH SERVICES | Facility: HOME HEALTH | Age: 69
End: 2024-07-12
Payer: MEDICARE

## 2024-07-12 VITALS
TEMPERATURE: 97.1 F | HEART RATE: 89 BPM | RESPIRATION RATE: 14 BRPM | SYSTOLIC BLOOD PRESSURE: 141 MMHG | OXYGEN SATURATION: 97 % | DIASTOLIC BLOOD PRESSURE: 88 MMHG

## 2024-07-12 PROCEDURE — G0151 HHCP-SERV OF PT,EA 15 MIN: HCPCS | Mod: HHH

## 2024-07-12 PROCEDURE — 169592 NO-PAY CLAIM PROCEDURE

## 2024-07-12 ASSESSMENT — ENCOUNTER SYMPTOMS
LOWEST PAIN SEVERITY IN PAST 24 HOURS: 1/10
HIGHEST PAIN SEVERITY IN PAST 24 HOURS: 10/10
OCCASIONAL FEELINGS OF UNSTEADINESS: 1
PAIN LOCATION: RIGHT HIP
PAIN: 1
PERSON REPORTING PAIN: PATIENT

## 2024-07-12 ASSESSMENT — ACTIVITIES OF DAILY LIVING (ADL)
AMBULATION ASSISTANCE ON FLAT SURFACES: 1
AMBULATION_DISTANCE/DURATION_TOLERATED: SHORT HOUSEHOLD
OASIS_M1830: 03
ENTERING_EXITING_HOME: NEEDS ASSISTANCE

## 2024-07-15 ENCOUNTER — HOME CARE VISIT (OUTPATIENT)
Dept: HOME HEALTH SERVICES | Facility: HOME HEALTH | Age: 69
End: 2024-07-15
Payer: MEDICARE

## 2024-07-15 ENCOUNTER — TELEPHONE (OUTPATIENT)
Dept: PRIMARY CARE | Facility: CLINIC | Age: 69
End: 2024-07-15
Payer: MEDICARE

## 2024-07-15 PROCEDURE — G0151 HHCP-SERV OF PT,EA 15 MIN: HCPCS | Mod: HHH

## 2024-07-15 ASSESSMENT — ACTIVITIES OF DAILY LIVING (ADL)
AMBULATION ASSISTANCE ON FLAT SURFACES: 1
AMBULATION_DISTANCE/DURATION_TOLERATED: HOUSEHOLD

## 2024-07-15 ASSESSMENT — ENCOUNTER SYMPTOMS
PAIN: 1
PERSON REPORTING PAIN: PATIENT
LOWEST PAIN SEVERITY IN PAST 24 HOURS: 0/10
HIGHEST PAIN SEVERITY IN PAST 24 HOURS: 7/10

## 2024-07-15 NOTE — TELEPHONE ENCOUNTER
PT broke her hip 7/08 she is home.  She is scheduled for hosp follow up 7/24.  She notice via MyChart that her calcium is low and is asking for an order to have checked along with her thyroid, to discuss when she comes in on the 24th

## 2024-07-16 DIAGNOSIS — E03.9 HYPOTHYROIDISM, UNSPECIFIED TYPE: ICD-10-CM

## 2024-07-16 DIAGNOSIS — E83.51 HYPOCALCEMIA: Primary | ICD-10-CM

## 2024-07-17 ENCOUNTER — HOME CARE VISIT (OUTPATIENT)
Dept: HOME HEALTH SERVICES | Facility: HOME HEALTH | Age: 69
End: 2024-07-17
Payer: MEDICARE

## 2024-07-17 DIAGNOSIS — S72.011A CLOSED SUBCAPITAL FRACTURE OF RIGHT FEMUR, INITIAL ENCOUNTER (MULTI): ICD-10-CM

## 2024-07-17 PROCEDURE — G0151 HHCP-SERV OF PT,EA 15 MIN: HCPCS | Mod: HHH

## 2024-07-17 ASSESSMENT — ACTIVITIES OF DAILY LIVING (ADL)
AMBULATION_DISTANCE/DURATION_TOLERATED: HOUSEHOLD
AMBULATION ASSISTANCE ON FLAT SURFACES: 1

## 2024-07-17 ASSESSMENT — ENCOUNTER SYMPTOMS
PERSON REPORTING PAIN: PATIENT
SUBJECTIVE PAIN PROGRESSION: GRADUALLY IMPROVING
PAIN: 1

## 2024-07-19 NOTE — DOCUMENTATION CLARIFICATION NOTE
"    PATIENT:               RAISA MORALES  ACCT #:                  [id number]  MRN:                       [id number]  :                       1955  ADMIT DATE:       2024 3:56 PM  DISCH DATE:        7/10/2024 12:03 PM  RESPONDING PROVIDER #:        [id number]          PROVIDER RESPONSE TEXT:    PRES is chronic and requiring monitoring during this stay    CDI QUERY TEXT:    Clarification        Instruction:    Based on your assessment of the patient and the clinical information, please provide the requested documentation by clicking on the appropriate radio button and enter any additional information if prompted.    Question: Please further clarify the diagnosis of CDI TO ENTER as    When answering this query, please exercise your independent professional judgment. The fact that a question is being asked, does not imply that any particular answer is desired or expected.    The patient's clinical indicators include:  Clinical Information: 7/10/24 Discharge diagnosis: \"Closed subcapital fracture of right femur s/p repair. HTN, Hypothyroid. History of MV repair. History of PRES.\"    Clinical Indicators:  24 H/P: \"Hx of PRES syndrome  -diagnosed in , no overt etiology for PRES was found by history and believed to be caused by her HTN  -continues home meds\" \" She denies fever chills, nausea vomiting, chest pain, shortness of breath, headache, dizziness, changes in vision, syncope.\"  24 Vital signs on admit: T 98, HR 72, RR 20, Bp 168/92, pox 98%  Repeat: HR 71, RR 18, Bp 140/77  24 H/P: Home meds: \" Amlodipine, Levothyroxine, Losartan, Metoprolol, ASA, Heparin.\"    Treatment: Monitor vital signs, labs    Risk Factors Hx of htn  Options provided:  -- PRES is chronic and requiring monitoring during this stay  -- PRES is history of and not present this admit  -- Other - I will add my own diagnosis  -- Refer to Clinical Documentation Reviewer    Query created by: Emeli Anderson on 2024 2:03 " PM      Electronically signed by:  NICOLE QUEZADA PA-C 7/19/2024 10:09 AM

## 2024-07-22 ENCOUNTER — HOME CARE VISIT (OUTPATIENT)
Dept: HOME HEALTH SERVICES | Facility: HOME HEALTH | Age: 69
End: 2024-07-22
Payer: MEDICARE

## 2024-07-22 PROCEDURE — G0151 HHCP-SERV OF PT,EA 15 MIN: HCPCS | Mod: HHH

## 2024-07-22 ASSESSMENT — ACTIVITIES OF DAILY LIVING (ADL)
AMBULATION ASSISTANCE ON FLAT SURFACES: 1
AMBULATION_DISTANCE/DURATION_TOLERATED: HOUSEHOLD

## 2024-07-22 ASSESSMENT — ENCOUNTER SYMPTOMS
SUBJECTIVE PAIN PROGRESSION: GRADUALLY IMPROVING
PERSON REPORTING PAIN: PATIENT
PAIN: 1

## 2024-07-23 ENCOUNTER — HOSPITAL ENCOUNTER (OUTPATIENT)
Dept: RADIOLOGY | Facility: HOSPITAL | Age: 69
Discharge: HOME | End: 2024-07-23
Payer: MEDICARE

## 2024-07-23 ENCOUNTER — APPOINTMENT (OUTPATIENT)
Dept: ORTHOPEDIC SURGERY | Facility: CLINIC | Age: 69
End: 2024-07-23
Payer: MEDICARE

## 2024-07-23 VITALS — BODY MASS INDEX: 29.16 KG/M2 | HEIGHT: 65 IN | WEIGHT: 175 LBS

## 2024-07-23 DIAGNOSIS — S72.001D CLOSED FRACTURE OF NECK OF RIGHT FEMUR WITH ROUTINE HEALING, SUBSEQUENT ENCOUNTER: Primary | ICD-10-CM

## 2024-07-23 DIAGNOSIS — S72.011A CLOSED SUBCAPITAL FRACTURE OF RIGHT FEMUR, INITIAL ENCOUNTER (MULTI): ICD-10-CM

## 2024-07-23 PROCEDURE — 73502 X-RAY EXAM HIP UNI 2-3 VIEWS: CPT | Mod: RT

## 2024-07-23 PROCEDURE — 99024 POSTOP FOLLOW-UP VISIT: CPT | Performed by: SPECIALIST

## 2024-07-23 PROCEDURE — 73502 X-RAY EXAM HIP UNI 2-3 VIEWS: CPT | Mod: RIGHT SIDE | Performed by: RADIOLOGY

## 2024-07-23 PROCEDURE — 1111F DSCHRG MED/CURRENT MED MERGE: CPT | Performed by: SPECIALIST

## 2024-07-23 PROCEDURE — 3008F BODY MASS INDEX DOCD: CPT | Performed by: SPECIALIST

## 2024-07-23 PROCEDURE — 1159F MED LIST DOCD IN RCRD: CPT | Performed by: SPECIALIST

## 2024-07-23 PROCEDURE — 1123F ACP DISCUSS/DSCN MKR DOCD: CPT | Performed by: SPECIALIST

## 2024-07-23 NOTE — PROGRESS NOTES
F/U RIGHT ORIF Hip DOS 7/8/24  XRAYS DONE TODAY    Pt had staples removed 7/22/24 pain has improved   Has done home PT since surgery   Pain is under good control and has no significant constitutional symptoms.    Exam: Right lower extremity with minimal swelling passive motion of the right hip does not reproduce any significant pain.  Her neurovascular status distal extremities is intact.  Rest exam unremarkable.  Negative Homans.    Radiographs: AP and lateral right hip identifies stable internal fixation of her valgus impacted femoral neck fracture.  Good perfusion of the femoral head no other significant findings.    Healing right femoral neck fracture.  We recommend 4-6 more weeks of using a walker during weightbearing.  Will repeat x-rays 6 weeks AP and lateral right hip.  She knows to call or come in sooner if any new symptoms arise.

## 2024-07-24 ENCOUNTER — APPOINTMENT (OUTPATIENT)
Dept: PRIMARY CARE | Facility: CLINIC | Age: 69
End: 2024-07-24
Payer: MEDICARE

## 2024-07-25 ENCOUNTER — HOME CARE VISIT (OUTPATIENT)
Dept: HOME HEALTH SERVICES | Facility: HOME HEALTH | Age: 69
End: 2024-07-25
Payer: MEDICARE

## 2024-07-25 PROCEDURE — G0157 HHC PT ASSISTANT EA 15: HCPCS | Mod: CQ,HHH

## 2024-07-25 ASSESSMENT — ENCOUNTER SYMPTOMS
HIGHEST PAIN SEVERITY IN PAST 24 HOURS: 5/10
PERSON REPORTING PAIN: PATIENT
PAIN LOCATION - PAIN SEVERITY: 0/10
PAIN: 1
LOWEST PAIN SEVERITY IN PAST 24 HOURS: 0/10
PAIN LOCATION: RIGHT HIP
SUBJECTIVE PAIN PROGRESSION: GRADUALLY IMPROVING
PAIN SEVERITY GOAL: 0/10

## 2024-07-29 ENCOUNTER — HOME CARE VISIT (OUTPATIENT)
Dept: HOME HEALTH SERVICES | Facility: HOME HEALTH | Age: 69
End: 2024-07-29
Payer: MEDICARE

## 2024-07-29 PROCEDURE — G0151 HHCP-SERV OF PT,EA 15 MIN: HCPCS | Mod: HHH

## 2024-07-29 ASSESSMENT — ENCOUNTER SYMPTOMS
PAIN: 1
SUBJECTIVE PAIN PROGRESSION: GRADUALLY IMPROVING
PERSON REPORTING PAIN: PATIENT

## 2024-07-29 ASSESSMENT — ACTIVITIES OF DAILY LIVING (ADL)
OASIS_M1830: 01
HOME_HEALTH_OASIS: 00
AMBULATION ASSISTANCE ON FLAT SURFACES: 1
AMBULATION_DISTANCE/DURATION_TOLERATED: HOUSEHOLD

## 2024-07-31 ENCOUNTER — LAB (OUTPATIENT)
Dept: LAB | Facility: LAB | Age: 69
End: 2024-07-31
Payer: MEDICARE

## 2024-07-31 DIAGNOSIS — E83.51 HYPOCALCEMIA: ICD-10-CM

## 2024-07-31 DIAGNOSIS — E03.9 HYPOTHYROIDISM, UNSPECIFIED TYPE: ICD-10-CM

## 2024-07-31 LAB
ANION GAP SERPL CALC-SCNC: 14 MMOL/L (ref 10–20)
BUN SERPL-MCNC: 19 MG/DL (ref 6–23)
CALCIUM SERPL-MCNC: 9.7 MG/DL (ref 8.6–10.3)
CHLORIDE SERPL-SCNC: 102 MMOL/L (ref 98–107)
CO2 SERPL-SCNC: 26 MMOL/L (ref 21–32)
CREAT SERPL-MCNC: 1 MG/DL (ref 0.5–1.05)
EGFRCR SERPLBLD CKD-EPI 2021: 61 ML/MIN/1.73M*2
GLUCOSE SERPL-MCNC: 81 MG/DL (ref 74–99)
POTASSIUM SERPL-SCNC: 4.6 MMOL/L (ref 3.5–5.3)
SODIUM SERPL-SCNC: 137 MMOL/L (ref 136–145)
TSH SERPL-ACNC: 2.35 MIU/L (ref 0.44–3.98)

## 2024-07-31 PROCEDURE — 84443 ASSAY THYROID STIM HORMONE: CPT

## 2024-07-31 PROCEDURE — 36415 COLL VENOUS BLD VENIPUNCTURE: CPT

## 2024-07-31 PROCEDURE — 80048 BASIC METABOLIC PNL TOTAL CA: CPT

## 2024-08-04 DIAGNOSIS — E03.9 HYPOTHYROIDISM, UNSPECIFIED TYPE: ICD-10-CM

## 2024-08-04 DIAGNOSIS — I10 PRIMARY HYPERTENSION: ICD-10-CM

## 2024-08-04 DIAGNOSIS — E55.9 VITAMIN D DEFICIENCY: ICD-10-CM

## 2024-08-04 DIAGNOSIS — E78.2 MIXED HYPERLIPIDEMIA: Primary | ICD-10-CM

## 2024-08-04 DIAGNOSIS — R73.9 ELEVATED BLOOD SUGAR: ICD-10-CM

## 2024-08-04 RX ORDER — AMLODIPINE BESYLATE 10 MG/1
10 TABLET ORAL DAILY
Qty: 90 TABLET | Refills: 1 | Status: SHIPPED | OUTPATIENT
Start: 2024-08-04

## 2024-08-04 RX ORDER — LEVOTHYROXINE SODIUM 125 UG/1
125 TABLET ORAL DAILY
Qty: 90 TABLET | Refills: 3 | Status: SHIPPED | OUTPATIENT
Start: 2024-08-04 | End: 2025-08-04

## 2024-08-29 DIAGNOSIS — S72.001D CLOSED FRACTURE OF NECK OF RIGHT FEMUR WITH ROUTINE HEALING, SUBSEQUENT ENCOUNTER: ICD-10-CM

## 2024-09-03 ENCOUNTER — APPOINTMENT (OUTPATIENT)
Dept: ORTHOPEDIC SURGERY | Facility: CLINIC | Age: 69
End: 2024-09-03
Payer: MEDICARE

## 2024-09-03 ENCOUNTER — HOSPITAL ENCOUNTER (OUTPATIENT)
Dept: RADIOLOGY | Facility: HOSPITAL | Age: 69
Discharge: HOME | End: 2024-09-03
Payer: MEDICARE

## 2024-09-03 VITALS — WEIGHT: 175 LBS | BODY MASS INDEX: 34.36 KG/M2 | HEIGHT: 60 IN

## 2024-09-03 DIAGNOSIS — S72.001D CLOSED FRACTURE OF NECK OF RIGHT FEMUR WITH ROUTINE HEALING, SUBSEQUENT ENCOUNTER: ICD-10-CM

## 2024-09-03 DIAGNOSIS — S72.001D CLOSED FRACTURE OF NECK OF RIGHT FEMUR WITH ROUTINE HEALING, SUBSEQUENT ENCOUNTER: Primary | ICD-10-CM

## 2024-09-03 PROCEDURE — 1036F TOBACCO NON-USER: CPT | Performed by: SPECIALIST

## 2024-09-03 PROCEDURE — 73502 X-RAY EXAM HIP UNI 2-3 VIEWS: CPT | Mod: RT

## 2024-09-03 PROCEDURE — 73502 X-RAY EXAM HIP UNI 2-3 VIEWS: CPT | Mod: RIGHT SIDE | Performed by: RADIOLOGY

## 2024-09-03 PROCEDURE — 99211 OFF/OP EST MAY X REQ PHY/QHP: CPT | Performed by: SPECIALIST

## 2024-09-03 PROCEDURE — 1159F MED LIST DOCD IN RCRD: CPT | Performed by: SPECIALIST

## 2024-09-03 PROCEDURE — 3008F BODY MASS INDEX DOCD: CPT | Performed by: SPECIALIST

## 2024-09-03 PROCEDURE — 1123F ACP DISCUSS/DSCN MKR DOCD: CPT | Performed by: SPECIALIST

## 2024-09-03 NOTE — PROGRESS NOTES
F/U RIGHT ORIF Hip DOS 7/8/24  XRAYS DONE TODAY    Patient in today walking with cane for added support  states her Hip feels great pain is minimal     Exam: Right hip with minimal swelling well-healed incision.  Slightly restricted internal/external rotation and impingement testing with mild pain.  But functional range of motion is excellent with good strength and minimal discomfort.  Negative Homans distal neurovascular intact.    Radiographs: AP and lateral right hip shows intact FNS hardware with progressive healing of the impacted fracture.  No signs of AVN    Assessment plan: Status post ORIF [place] 1/2 femoral neck fracture doing well.  She understands it can be another 2 to 4 months of regaining full strength and motion and allowing further healing to mature.  Will do a final assessment in 2 months no x-rays if doing well.

## 2024-11-05 ENCOUNTER — APPOINTMENT (OUTPATIENT)
Dept: ORTHOPEDIC SURGERY | Facility: HOSPITAL | Age: 69
End: 2024-11-05
Payer: MEDICARE

## 2024-11-05 VITALS — BODY MASS INDEX: 34.36 KG/M2 | HEIGHT: 60 IN | WEIGHT: 175 LBS

## 2024-11-05 DIAGNOSIS — S72.001D CLOSED FRACTURE OF NECK OF RIGHT FEMUR WITH ROUTINE HEALING, SUBSEQUENT ENCOUNTER: Primary | ICD-10-CM

## 2024-11-05 PROCEDURE — 99213 OFFICE O/P EST LOW 20 MIN: CPT | Performed by: SPECIALIST

## 2024-11-05 PROCEDURE — 1159F MED LIST DOCD IN RCRD: CPT | Performed by: SPECIALIST

## 2024-11-05 PROCEDURE — 3008F BODY MASS INDEX DOCD: CPT | Performed by: SPECIALIST

## 2024-11-05 PROCEDURE — 1123F ACP DISCUSS/DSCN MKR DOCD: CPT | Performed by: SPECIALIST

## 2024-11-05 NOTE — PROGRESS NOTES
F/U RIGHT ORIF Hip DOS 7/8/24   Patient is doing well.  She has minimal weakness of the right hip but no significant pain.  She has a history of some right knee issues and will probably see me in the future with up-to-date knee x-rays.    Exam: Right hip with improved painless motion 5 out of 5 strength no swelling.  Distal neurovascular intact negative Homans.    Radiographs right hip show stable fixation of her femoral neck fracture which appears to be well-healed.    Status post ORIF right femoral neck fracture well-healed.  Continue all activities as tolerated, low impact.  Follow-up for right knee pain in the future with up-to-date standing knee radiographs.

## 2024-11-24 DIAGNOSIS — I10 PRIMARY HYPERTENSION: ICD-10-CM

## 2024-11-25 RX ORDER — LOSARTAN POTASSIUM 100 MG/1
100 TABLET ORAL DAILY
Qty: 90 TABLET | Refills: 3 | Status: SHIPPED | OUTPATIENT
Start: 2024-11-25

## 2024-12-02 ENCOUNTER — LAB (OUTPATIENT)
Dept: LAB | Facility: LAB | Age: 69
End: 2024-12-02
Payer: MEDICARE

## 2024-12-02 DIAGNOSIS — E78.2 MIXED HYPERLIPIDEMIA: ICD-10-CM

## 2024-12-02 DIAGNOSIS — I10 PRIMARY HYPERTENSION: ICD-10-CM

## 2024-12-02 DIAGNOSIS — E03.9 HYPOTHYROIDISM, UNSPECIFIED TYPE: ICD-10-CM

## 2024-12-02 DIAGNOSIS — E55.9 VITAMIN D DEFICIENCY: ICD-10-CM

## 2024-12-02 DIAGNOSIS — R73.9 ELEVATED BLOOD SUGAR: ICD-10-CM

## 2024-12-02 LAB
25(OH)D3 SERPL-MCNC: 83 NG/ML (ref 30–100)
ALBUMIN SERPL BCP-MCNC: 4.1 G/DL (ref 3.4–5)
ALP SERPL-CCNC: 76 U/L (ref 33–136)
ALT SERPL W P-5'-P-CCNC: 17 U/L (ref 7–45)
ANION GAP SERPL CALC-SCNC: 9 MMOL/L (ref 10–20)
AST SERPL W P-5'-P-CCNC: 17 U/L (ref 9–39)
BASOPHILS # BLD AUTO: 0.06 X10*3/UL (ref 0–0.1)
BASOPHILS NFR BLD AUTO: 0.9 %
BILIRUB SERPL-MCNC: 0.6 MG/DL (ref 0–1.2)
BUN SERPL-MCNC: 16 MG/DL (ref 6–23)
CALCIUM SERPL-MCNC: 9.4 MG/DL (ref 8.6–10.3)
CHLORIDE SERPL-SCNC: 100 MMOL/L (ref 98–107)
CHOLEST SERPL-MCNC: 177 MG/DL (ref 0–199)
CHOLESTEROL/HDL RATIO: 2.3
CO2 SERPL-SCNC: 30 MMOL/L (ref 21–32)
CREAT SERPL-MCNC: 0.89 MG/DL (ref 0.5–1.05)
EGFRCR SERPLBLD CKD-EPI 2021: 70 ML/MIN/1.73M*2
EOSINOPHIL # BLD AUTO: 0.18 X10*3/UL (ref 0–0.7)
EOSINOPHIL NFR BLD AUTO: 2.6 %
ERYTHROCYTE [DISTWIDTH] IN BLOOD BY AUTOMATED COUNT: 14.5 % (ref 11.5–14.5)
EST. AVERAGE GLUCOSE BLD GHB EST-MCNC: 105 MG/DL
GLUCOSE SERPL-MCNC: 86 MG/DL (ref 74–99)
HBA1C MFR BLD: 5.3 %
HCT VFR BLD AUTO: 44.9 % (ref 36–46)
HDLC SERPL-MCNC: 77.5 MG/DL
HGB BLD-MCNC: 14.7 G/DL (ref 12–16)
IMM GRANULOCYTES # BLD AUTO: 0.01 X10*3/UL (ref 0–0.7)
IMM GRANULOCYTES NFR BLD AUTO: 0.1 % (ref 0–0.9)
LDLC SERPL CALC-MCNC: 80 MG/DL
LYMPHOCYTES # BLD AUTO: 2.41 X10*3/UL (ref 1.2–4.8)
LYMPHOCYTES NFR BLD AUTO: 34.9 %
MCH RBC QN AUTO: 31.5 PG (ref 26–34)
MCHC RBC AUTO-ENTMCNC: 32.7 G/DL (ref 32–36)
MCV RBC AUTO: 96 FL (ref 80–100)
MONOCYTES # BLD AUTO: 0.72 X10*3/UL (ref 0.1–1)
MONOCYTES NFR BLD AUTO: 10.4 %
NEUTROPHILS # BLD AUTO: 3.53 X10*3/UL (ref 1.2–7.7)
NEUTROPHILS NFR BLD AUTO: 51.1 %
NON HDL CHOLESTEROL: 100 MG/DL (ref 0–149)
NRBC BLD-RTO: 0 /100 WBCS (ref 0–0)
PLATELET # BLD AUTO: 310 X10*3/UL (ref 150–450)
POTASSIUM SERPL-SCNC: 4.4 MMOL/L (ref 3.5–5.3)
PROT SERPL-MCNC: 6.5 G/DL (ref 6.4–8.2)
RBC # BLD AUTO: 4.67 X10*6/UL (ref 4–5.2)
SODIUM SERPL-SCNC: 135 MMOL/L (ref 136–145)
T4 FREE SERPL-MCNC: 1.1 NG/DL (ref 0.61–1.12)
TRIGL SERPL-MCNC: 98 MG/DL (ref 0–149)
TSH SERPL-ACNC: 0.36 MIU/L (ref 0.44–3.98)
VLDL: 20 MG/DL (ref 0–40)
WBC # BLD AUTO: 6.9 X10*3/UL (ref 4.4–11.3)

## 2024-12-02 PROCEDURE — 82306 VITAMIN D 25 HYDROXY: CPT

## 2024-12-02 PROCEDURE — 84439 ASSAY OF FREE THYROXINE: CPT

## 2024-12-02 PROCEDURE — 80053 COMPREHEN METABOLIC PANEL: CPT

## 2024-12-02 PROCEDURE — 85025 COMPLETE CBC W/AUTO DIFF WBC: CPT

## 2024-12-02 PROCEDURE — 84443 ASSAY THYROID STIM HORMONE: CPT

## 2024-12-02 PROCEDURE — 80061 LIPID PANEL: CPT

## 2024-12-02 PROCEDURE — 83036 HEMOGLOBIN GLYCOSYLATED A1C: CPT

## 2024-12-02 PROCEDURE — 36415 COLL VENOUS BLD VENIPUNCTURE: CPT

## 2024-12-04 ENCOUNTER — APPOINTMENT (OUTPATIENT)
Dept: PRIMARY CARE | Facility: CLINIC | Age: 69
End: 2024-12-04
Payer: MEDICARE

## 2024-12-04 VITALS
DIASTOLIC BLOOD PRESSURE: 80 MMHG | HEIGHT: 65 IN | BODY MASS INDEX: 28.89 KG/M2 | SYSTOLIC BLOOD PRESSURE: 130 MMHG | TEMPERATURE: 97.2 F | WEIGHT: 173.4 LBS

## 2024-12-04 DIAGNOSIS — E78.2 MIXED HYPERLIPIDEMIA: Chronic | ICD-10-CM

## 2024-12-04 DIAGNOSIS — Z00.00 ROUTINE GENERAL MEDICAL EXAMINATION AT A HEALTH CARE FACILITY: Primary | ICD-10-CM

## 2024-12-04 DIAGNOSIS — I10 ESSENTIAL (PRIMARY) HYPERTENSION: Chronic | ICD-10-CM

## 2024-12-04 DIAGNOSIS — E03.9 HYPOTHYROIDISM, UNSPECIFIED TYPE: ICD-10-CM

## 2024-12-04 DIAGNOSIS — E66.3 OVERWEIGHT WITH BODY MASS INDEX (BMI) 25.0-29.9: Chronic | ICD-10-CM

## 2024-12-04 PROCEDURE — 1159F MED LIST DOCD IN RCRD: CPT | Performed by: NURSE PRACTITIONER

## 2024-12-04 PROCEDURE — 1158F ADVNC CARE PLAN TLK DOCD: CPT | Performed by: NURSE PRACTITIONER

## 2024-12-04 PROCEDURE — 3008F BODY MASS INDEX DOCD: CPT | Performed by: NURSE PRACTITIONER

## 2024-12-04 PROCEDURE — 3075F SYST BP GE 130 - 139MM HG: CPT | Performed by: NURSE PRACTITIONER

## 2024-12-04 PROCEDURE — 3079F DIAST BP 80-89 MM HG: CPT | Performed by: NURSE PRACTITIONER

## 2024-12-04 PROCEDURE — 1160F RVW MEDS BY RX/DR IN RCRD: CPT | Performed by: NURSE PRACTITIONER

## 2024-12-04 PROCEDURE — 99213 OFFICE O/P EST LOW 20 MIN: CPT | Performed by: NURSE PRACTITIONER

## 2024-12-04 PROCEDURE — 1123F ACP DISCUSS/DSCN MKR DOCD: CPT | Performed by: NURSE PRACTITIONER

## 2024-12-04 PROCEDURE — G0439 PPPS, SUBSEQ VISIT: HCPCS | Performed by: NURSE PRACTITIONER

## 2024-12-04 PROCEDURE — 1170F FXNL STATUS ASSESSED: CPT | Performed by: NURSE PRACTITIONER

## 2024-12-04 PROCEDURE — 1036F TOBACCO NON-USER: CPT | Performed by: NURSE PRACTITIONER

## 2024-12-04 RX ORDER — LEVOTHYROXINE SODIUM 112 UG/1
112 TABLET ORAL DAILY
Qty: 90 TABLET | Refills: 0 | Status: SHIPPED | OUTPATIENT
Start: 2024-12-04 | End: 2025-12-04

## 2024-12-04 ASSESSMENT — PATIENT HEALTH QUESTIONNAIRE - PHQ9
2. FEELING DOWN, DEPRESSED OR HOPELESS: NOT AT ALL
SUM OF ALL RESPONSES TO PHQ9 QUESTIONS 1 AND 2: 0
SUM OF ALL RESPONSES TO PHQ9 QUESTIONS 1 AND 2: 0
2. FEELING DOWN, DEPRESSED OR HOPELESS: NOT AT ALL
1. LITTLE INTEREST OR PLEASURE IN DOING THINGS: NOT AT ALL
1. LITTLE INTEREST OR PLEASURE IN DOING THINGS: NOT AT ALL

## 2024-12-04 ASSESSMENT — ACTIVITIES OF DAILY LIVING (ADL)
DRESSING: INDEPENDENT
MANAGING_FINANCES: INDEPENDENT
TAKING_MEDICATION: INDEPENDENT
DOING_HOUSEWORK: INDEPENDENT
BATHING: INDEPENDENT
GROCERY_SHOPPING: INDEPENDENT

## 2024-12-04 NOTE — PATIENT INSTRUCTIONS
Good to see you today.  Decrease the thyroid to 112 mcg once daily.  When you have about a week left, do some NON-Fasting labs (no supplements 24 hours before the test).  Let me know if your weight fluctuates 10 #   Keep up the good work with taking better care of yourself.    Try some yoga or core work.          Ways to Help Prevent Falls at Home    Quick Tips   ? Ask for help if you need it. Most people want to help!   ? Get up slowly after sitting or laying down   ? Wear a medical alert device or keep cell phone in your pocket   ? Use night lights, especially areas near a bathroom   ? Keep the items you use often within reach on a small stool or end table   ? Use an assistive device such as walker or cane, as directed by provider/physical therapy   ? Use a non-slip mat and grab bars in your bathroom. Look for home health sections for best options     Other Areas to Focus On   ? Exercise and nutrition: Regular exercise or taking a falls prevention class are great ways improve strength and balance. Don’t forget to stay hydrated and bring a snack!   ? Medicine side effects: Some medicines can make you sleepy or dizzy, which could cause a fall. Ask your healthcare provider about the side effects your medicines could cause. Be sure to let them know if you take any vitamins or supplements as well.   ? Tripping hazards: Remove items you could trip on, such as loose mats, rugs, cords, and clutter. Wear closed toe shoes with rubber soles.   ? Health and wellness: Get regular checkups with your healthcare provider, plus routine vision and hearing screenings. Talk with your healthcare provider about:   o Your medicines and the possible side effects - bring them in a bag if that is easier!   o Problems with balance or feeling dizzy   o Ways to promote bone health, such as Vitamin D and calcium supplements   o Questions or concerns about falling     *Ask your healthcare team if you have questions     [facility],  2022         Ways to Help Prevent Falls at Home    Quick Tips   ? Ask for help if you need it. Most people want to help!   ? Get up slowly after sitting or laying down   ? Wear a medical alert device or keep cell phone in your pocket   ? Use night lights, especially areas near a bathroom   ? Keep the items you use often within reach on a small stool or end table   ? Use an assistive device such as walker or cane, as directed by provider/physical therapy   ? Use a non-slip mat and grab bars in your bathroom. Look for home health sections for best options     Other Areas to Focus On   ? Exercise and nutrition: Regular exercise or taking a falls prevention class are great ways improve strength and balance. Don’t forget to stay hydrated and bring a snack!   ? Medicine side effects: Some medicines can make you sleepy or dizzy, which could cause a fall. Ask your healthcare provider about the side effects your medicines could cause. Be sure to let them know if you take any vitamins or supplements as well.   ? Tripping hazards: Remove items you could trip on, such as loose mats, rugs, cords, and clutter. Wear closed toe shoes with rubber soles.   ? Health and wellness: Get regular checkups with your healthcare provider, plus routine vision and hearing screenings. Talk with your healthcare provider about:   o Your medicines and the possible side effects - bring them in a bag if that is easier!   o Problems with balance or feeling dizzy   o Ways to promote bone health, such as Vitamin D and calcium supplements   o Questions or concerns about falling     *Ask your healthcare team if you have questions     [facility], 2022

## 2024-12-04 NOTE — PROGRESS NOTES
"Subjective   Patient ID: Jeanna Haynes is a 69 y.o. female who presents for Medicare Annual Wellness Visit Subsequent.    HPI   Here for Medicare Annual Wellness  Started taking 300mg of magnesium and is sleeping well  Also seems to be helping with weight reduction.   Last OV weight 189 #    Taking Caclium  Fairlife shakes  so plenty of protein  On Doxycycline for cyst on flank  DERM  Emperatriz Rolan  ORTHO Tisdel - right femur fracture  CARDIOLOGY  May    Review of Systems   Constitutional:  Positive for activity change, appetite change and unexpected weight change.   Cardiovascular:  Negative for chest pain, palpitations and leg swelling.       Objective   /80   Temp 36.2 °C (97.2 °F)   Ht 1.651 m (5' 5\")   Wt 78.7 kg (173 lb 6.4 oz)   BMI 28.86 kg/m²     Physical Exam  Vitals and nursing note reviewed.   Constitutional:       Appearance: Normal appearance. She is obese.   HENT:      Head: Normocephalic and atraumatic.      Right Ear: Tympanic membrane, ear canal and external ear normal.      Left Ear: Tympanic membrane, ear canal and external ear normal.      Nose: Nose normal.      Mouth/Throat:      Mouth: Mucous membranes are moist.      Pharynx: Oropharynx is clear.   Eyes:      Extraocular Movements: Extraocular movements intact.      Conjunctiva/sclera: Conjunctivae normal.      Pupils: Pupils are equal, round, and reactive to light.   Neck:      Thyroid: No thyroid mass, thyromegaly or thyroid tenderness.   Cardiovascular:      Rate and Rhythm: Normal rate and regular rhythm.      Pulses: Normal pulses.      Heart sounds: Murmur heard.   Pulmonary:      Effort: Pulmonary effort is normal.      Breath sounds: Normal breath sounds.   Abdominal:      General: Bowel sounds are normal.      Palpations: Abdomen is soft.   Genitourinary:     Comments: Deferred  Musculoskeletal:      Cervical back: Normal range of motion and neck supple.      Comments: Mildly antalgic right hip   Skin:     General: Skin " is warm.      Capillary Refill: Capillary refill takes 2 to 3 seconds.   Neurological:      Mental Status: She is alert and oriented to person, place, and time. Mental status is at baseline.   Psychiatric:         Mood and Affect: Mood normal.         Behavior: Behavior normal.         Thought Content: Thought content normal.         Judgment: Judgment normal.         Assessment/Plan   Assessment & Plan  Hypothyroidism, unspecified type    Orders:    levothyroxine (Synthroid, Levoxyl) 112 mcg tablet; Take 1 tablet (112 mcg) by mouth once daily.    TSH with reflex to Free T4 if abnormal; Future    Routine general medical examination at a health care facility         Essential (primary) hypertension  Stable on Rx         Mixed hyperlipidemia  Improved.  Risk Ratio 2.3         Overweight with body mass index (BMI) 25.0-29.9              Patient was identified as a fall risk. Risk prevention instructions provided.Patient was identified as a fall risk. Risk prevention instructions provided.

## 2024-12-11 ENCOUNTER — OFFICE VISIT (OUTPATIENT)
Dept: CARDIOLOGY | Facility: CLINIC | Age: 69
End: 2024-12-11
Payer: MEDICARE

## 2024-12-11 VITALS
SYSTOLIC BLOOD PRESSURE: 128 MMHG | WEIGHT: 176 LBS | HEIGHT: 65 IN | HEART RATE: 83 BPM | DIASTOLIC BLOOD PRESSURE: 79 MMHG | BODY MASS INDEX: 29.32 KG/M2 | TEMPERATURE: 97.2 F

## 2024-12-11 DIAGNOSIS — I36.1 NONRHEUMATIC TRICUSPID VALVE REGURGITATION: ICD-10-CM

## 2024-12-11 DIAGNOSIS — I10 ESSENTIAL (PRIMARY) HYPERTENSION: ICD-10-CM

## 2024-12-11 DIAGNOSIS — R60.9 EDEMA, UNSPECIFIED TYPE: ICD-10-CM

## 2024-12-11 DIAGNOSIS — I34.0 NONRHEUMATIC MITRAL (VALVE) INSUFFICIENCY: Primary | ICD-10-CM

## 2024-12-11 DIAGNOSIS — I10 PRIMARY HYPERTENSION: ICD-10-CM

## 2024-12-11 PROCEDURE — 1123F ACP DISCUSS/DSCN MKR DOCD: CPT | Performed by: NURSE PRACTITIONER

## 2024-12-11 PROCEDURE — 99214 OFFICE O/P EST MOD 30 MIN: CPT | Performed by: NURSE PRACTITIONER

## 2024-12-11 PROCEDURE — 1159F MED LIST DOCD IN RCRD: CPT | Performed by: NURSE PRACTITIONER

## 2024-12-11 PROCEDURE — 3078F DIAST BP <80 MM HG: CPT | Performed by: NURSE PRACTITIONER

## 2024-12-11 PROCEDURE — 3008F BODY MASS INDEX DOCD: CPT | Performed by: NURSE PRACTITIONER

## 2024-12-11 PROCEDURE — 1160F RVW MEDS BY RX/DR IN RCRD: CPT | Performed by: NURSE PRACTITIONER

## 2024-12-11 PROCEDURE — 3074F SYST BP LT 130 MM HG: CPT | Performed by: NURSE PRACTITIONER

## 2024-12-11 PROCEDURE — 1036F TOBACCO NON-USER: CPT | Performed by: NURSE PRACTITIONER

## 2024-12-11 RX ORDER — NEBIVOLOL 10 MG/1
10 TABLET ORAL DAILY
Qty: 90 TABLET | Refills: 3 | Status: SHIPPED | OUTPATIENT
Start: 2024-12-11

## 2024-12-11 NOTE — PROGRESS NOTES
"Chief Complaint:   History of Mitral Valve Repair     History Of Present Illness:    Jeanna Haynes is a 69 y.o. female here for follow up post Mitral valve repair in 2021. The patient has been well since their last appointment and has no cardiac complaints.  The patient denies chest pain, or any systemic symptoms.  The patient is compliant with aspirin and antibiotic prophylaxis to prevent endocarditis.  Their most recent echocardiogram demonstrates normal prosthetic valve function.  Needs echo for this year.     Intermittent SOB. Denies LE edema.     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 Reported occasional palpitations at the time.     Reports occasional palpitations at present.     TTE 12/11/2023   1. Left ventricular systolic function is normal.   2. Moderately increased left ventricular septal thickness.   3. There is mildly reduced right ventricular systolic function.   4. The left atrium is moderately dilated.   5. The mitral valve is moderately thickened.   6. Aortic valve sclerosis.    LHC in 2021 which was negative for significant CAD  (MV repair with 34 stimuplus annuloplasty ring) - 10/18/2021    Brother 68 had RFA. Just got a ppm.      Allergies:  Penicillins and Latex    Review of Systems  All pertinent systems have been reviewed and are negative except for what is stated in the history of present illness.    All other systems have been reviewed and are negative and noncontributory to this patient's current ailments.     Visit Vitals  /79   Pulse 83   Temp 36.2 °C (97.2 °F)   Ht 1.651 m (5' 5\")   Wt 79.8 kg (176 lb)   BMI 29.29 kg/m²   OB Status Postmenopausal   Smoking Status Former   BSA 1.91 m²         Last Labs:  CBC -  Lab Results   Component Value Date    WBC 6.9 12/02/2024    HGB 14.7 12/02/2024    HCT 44.9 12/02/2024    MCV 96 12/02/2024     12/02/2024       CMP -  Lab Results   Component Value Date    CALCIUM 9.4 12/02/2024    PHOS 2.3 (L) 10/22/2021    PROT 6.5 12/02/2024 "    ALBUMIN 4.1 12/02/2024    AST 17 12/02/2024    ALT 17 12/02/2024    ALKPHOS 76 12/02/2024    BILITOT 0.6 12/02/2024       LIPID PANEL -   Lab Results   Component Value Date    CHOL 177 12/02/2024    TRIG 98 12/02/2024    HDL 77.5 12/02/2024    CHHDL 2.3 12/02/2024    LDLF 79 11/14/2022    VLDL 20 12/02/2024    NHDL 100 12/02/2024       RENAL FUNCTION PANEL -   Lab Results   Component Value Date    GLUCOSE 86 12/02/2024     (L) 12/02/2024    K 4.4 12/02/2024     12/02/2024    CO2 30 12/02/2024    ANIONGAP 9 (L) 12/02/2024    BUN 16 12/02/2024    CREATININE 0.89 12/02/2024    CALCIUM 9.4 12/02/2024    PHOS 2.3 (L) 10/22/2021    ALBUMIN 4.1 12/02/2024        Lab Results   Component Value Date    HGBA1C 5.3 12/02/2024       Objective   Vitals reviewed.   Constitutional:       Appearance: Healthy appearance. Not in distress.   Neck:      Vascular: No JVR. JVD normal.   Pulmonary:      Effort: Pulmonary effort is normal.      Breath sounds: Normal breath sounds. No wheezing. No rhonchi. No rales.   Chest:      Chest wall: Not tender to palpatation.   Cardiovascular:      PMI at left midclavicular line. Normal rate. Regular rhythm. Normal S1. Normal S2.       Murmurs: There is a grade 1/6 systolic murmur.      No gallop.  No click. No rub.   Pulses:     Intact distal pulses.   Edema:     Peripheral edema absent.   Abdominal:      General: Bowel sounds are normal.      Palpations: Abdomen is soft.      Tenderness: There is no abdominal tenderness.   Musculoskeletal: Normal range of motion.         General: No tenderness. Skin:     General: Skin is warm and dry.   Neurological:      General: No focal deficit present.      Mental Status: Alert and oriented to person, place and time.       Assessment/Plan   Diagnoses and all orders for this visit:  Nonrheumatic mitral (valve) insufficiency  - sp repair  - annual echo  - need echo for this year  Essential (primary) hypertension  - well controlled  - continue  losartan and nebivolol  Edema  - resolved  - does not need compression socks  Tricuspid regurgitation  - mild  - annual echo  PVC  - trigeminy post surgery  - will monitor during TTE. If relatively frequent we will place a holter.   - continue nebivolol    Follow up for TTE    Current Outpatient Medications on File Prior to Visit   Medication Sig Dispense Refill    amLODIPine (Norvasc) 10 mg tablet Take 1 tablet (10 mg) by mouth once daily. 90 tablet 1    amoxicillin (Amoxil) 500 mg capsule Take 4 PO one hour before dental appointment 12 capsule 1    cetirizine (ZyrTEC) 10 mg tablet Take 1 tablet (10 mg) by mouth once daily.      cholecalciferol (Vitamin D-3) 25 MCG (1000 UT) tablet Take 2 tablets (2,000 Units) by mouth once daily.      levothyroxine (Synthroid, Levoxyl) 112 mcg tablet Take 1 tablet (112 mcg) by mouth once daily. 90 tablet 0    losartan (Cozaar) 100 mg tablet Take 1 tablet by mouth once daily 90 tablet 3    magnesium 200 mg tablet Take 2 tablets (400 mg) by mouth once daily.      nebivolol (Bystolic) 10 mg tablet Take 1 tablet (10 mg) by mouth once daily. 90 tablet 1    potassium gluconate 600 mg (99 mg) tablet Take 600 mg by mouth.       No current facility-administered medications on file prior to visit.     Exclusive of any other services or procedures performed, I, Mariangel PUCKETT, spent 30 minutes in duration for this visit today.  This time consisted of chart review, obtaining history, and/or performing the exam as documented above, as well as, documenting the clinical information for the encounter in the electronic record, discussing treatment options, plans, and/or goals with patient, family, and/or caregiver, refilling medications, updating the electronic record, ordering medicines, lab work, imaging, referrals, and/or procedures as documented above and communicating with other [facility] professionals. I have discussed the results of laboratory, radiology, and cardiology studies with  the patient and their family/caregiver.         I reviewed PCP note, labs (tsh,lipid, vitamin d, hga1c, cmp, cbc), TTE

## 2025-01-02 ENCOUNTER — HOSPITAL ENCOUNTER (OUTPATIENT)
Dept: CARDIOLOGY | Facility: CLINIC | Age: 70
Discharge: HOME | End: 2025-01-02
Payer: MEDICARE

## 2025-01-02 DIAGNOSIS — I34.0 NONRHEUMATIC MITRAL (VALVE) INSUFFICIENCY: ICD-10-CM

## 2025-01-02 DIAGNOSIS — Z98.890 OTHER SPECIFIED POSTPROCEDURAL STATES: ICD-10-CM

## 2025-01-02 LAB
AORTIC VALVE MEAN GRADIENT: 6 MMHG
AORTIC VALVE PEAK VELOCITY: 1.73 M/S
AV PEAK GRADIENT: 12 MMHG
AVA (PEAK VEL): 2.55 CM2
AVA (VTI): 2.91 CM2
EJECTION FRACTION APICAL 4 CHAMBER: 64.2
EJECTION FRACTION: 70 %
LEFT ATRIUM VOLUME AREA LENGTH INDEX BSA: 47.7 ML/M2
LEFT VENTRICLE INTERNAL DIMENSION DIASTOLE: 4.58 CM (ref 3.5–6)
LEFT VENTRICULAR OUTFLOW TRACT DIAMETER: 2.05 CM
MITRAL VALVE E/A RATIO: 0.83
RIGHT VENTRICLE FREE WALL PEAK S': 11 CM/S
RIGHT VENTRICLE PEAK SYSTOLIC PRESSURE: 35.1 MMHG
TRICUSPID ANNULAR PLANE SYSTOLIC EXCURSION: 2.3 CM

## 2025-01-02 PROCEDURE — 93306 TTE W/DOPPLER COMPLETE: CPT | Performed by: INTERNAL MEDICINE

## 2025-01-02 PROCEDURE — 93306 TTE W/DOPPLER COMPLETE: CPT

## 2025-01-20 PROBLEM — J98.01 BRONCHOSPASM: Status: RESOLVED | Noted: 2023-05-26 | Resolved: 2025-01-20

## 2025-01-20 PROBLEM — I10 ESSENTIAL (PRIMARY) HYPERTENSION: Chronic | Status: ACTIVE | Noted: 2023-05-09

## 2025-01-20 PROBLEM — R05.9 COUGH: Status: RESOLVED | Noted: 2023-05-26 | Resolved: 2025-01-20

## 2025-01-20 ASSESSMENT — ENCOUNTER SYMPTOMS
ACTIVITY CHANGE: 1
UNEXPECTED WEIGHT CHANGE: 1
APPETITE CHANGE: 1
PALPITATIONS: 0

## 2025-01-20 NOTE — ASSESSMENT & PLAN NOTE
Orders:    levothyroxine (Synthroid, Levoxyl) 112 mcg tablet; Take 1 tablet (112 mcg) by mouth once daily.    TSH with reflex to Free T4 if abnormal; Future

## 2025-02-20 LAB — TSH SERPL-ACNC: 2.61 MIU/L (ref 0.4–4.5)

## 2025-02-21 DIAGNOSIS — E03.9 HYPOTHYROIDISM, UNSPECIFIED TYPE: ICD-10-CM

## 2025-02-21 RX ORDER — LEVOTHYROXINE SODIUM 112 UG/1
112 TABLET ORAL DAILY
Qty: 90 TABLET | Refills: 3 | Status: SHIPPED | OUTPATIENT
Start: 2025-02-21 | End: 2026-02-21

## 2025-08-09 DIAGNOSIS — I10 PRIMARY HYPERTENSION: ICD-10-CM

## 2025-08-11 RX ORDER — BETAMETHASONE DIPROPIONATE 0.5 MG/G
CREAM TOPICAL 2 TIMES DAILY
COMMUNITY
Start: 2025-07-11

## 2025-08-12 RX ORDER — AMLODIPINE BESYLATE 10 MG/1
10 TABLET ORAL DAILY
Qty: 90 TABLET | Refills: 0 | Status: SHIPPED | OUTPATIENT
Start: 2025-08-12

## 2025-12-05 ENCOUNTER — APPOINTMENT (OUTPATIENT)
Dept: PRIMARY CARE | Facility: CLINIC | Age: 70
End: 2025-12-05
Payer: MEDICARE

## (undated) DEVICE — Device

## (undated) DEVICE — SOLUTION, IRRIGATION, SODIUM CHLORIDE 0.9%, 1000 ML, POUR BOTTLE

## (undated) DEVICE — SUTURE, VICRYL, 2-0, 27 IN, FS-1, UNDYED

## (undated) DEVICE — TOWEL PACK, STERILE, 4/PACK, BLUE

## (undated) DEVICE — PAD, GROUNDING, ELECTROSURGICAL, W/9 FT CABLE, POLYHESIVE II, ADULT, LF

## (undated) DEVICE — DRESSING, AQUACEL AG, HYDROFIBER W/SILVER, 3.5 X 6 IN

## (undated) DEVICE — COVER HANDLE LIGHT, STERIS, BLUE, STERILE

## (undated) DEVICE — GLOVE, PROTEXIS PI CLASSIC, SZ-8.0, PF, PF, LF

## (undated) DEVICE — NEEDLE, HYPODERMIC, REGULAR WALL, REGULAR BEVEL, 22 G X 1.5 IN

## (undated) DEVICE — GLOVE, SURGICAL, PROTEXIS PI BLUE W/NEUTHERA, 8.0, PF, LF

## (undated) DEVICE — KIT, COMPRESSION HIP, CUSTOM, PORTAGE

## (undated) DEVICE — APPLICATOR, CHLORAPREP, W/ORANGE TINT, 26ML

## (undated) DEVICE — SYRINGE, 30 CC, LUER LOCK

## (undated) DEVICE — DRILL BIT, 4.3 X 413MM

## (undated) DEVICE — SUTURE, VICRYL, 0, 36 IN, CT-1, UNDYED

## (undated) DEVICE — STAPLER, SKIN, MULTIFIRE, PREMIUM, WIDE, 35 W

## (undated) DEVICE — GUIDEWIRE, 3.2 X 400